# Patient Record
Sex: FEMALE | Race: WHITE | NOT HISPANIC OR LATINO | Employment: OTHER | URBAN - METROPOLITAN AREA
[De-identification: names, ages, dates, MRNs, and addresses within clinical notes are randomized per-mention and may not be internally consistent; named-entity substitution may affect disease eponyms.]

---

## 2017-05-26 ENCOUNTER — OFFICE VISIT (OUTPATIENT)
Dept: INTERNAL MEDICINE | Facility: CLINIC | Age: 42
End: 2017-05-26

## 2017-05-26 VITALS
BODY MASS INDEX: 37.25 KG/M2 | HEIGHT: 65 IN | SYSTOLIC BLOOD PRESSURE: 112 MMHG | DIASTOLIC BLOOD PRESSURE: 78 MMHG | WEIGHT: 223.6 LBS | TEMPERATURE: 98.5 F

## 2017-05-26 DIAGNOSIS — M54.2 NECK PAIN: ICD-10-CM

## 2017-05-26 DIAGNOSIS — F41.9 ANXIETY: ICD-10-CM

## 2017-05-26 DIAGNOSIS — R39.9 UTI SYMPTOMS: Primary | ICD-10-CM

## 2017-05-26 DIAGNOSIS — F32.A DEPRESSION, UNSPECIFIED DEPRESSION TYPE: ICD-10-CM

## 2017-05-26 DIAGNOSIS — R31.9 URINARY TRACT INFECTION WITH HEMATURIA, SITE UNSPECIFIED: ICD-10-CM

## 2017-05-26 DIAGNOSIS — N39.0 URINARY TRACT INFECTION WITH HEMATURIA, SITE UNSPECIFIED: ICD-10-CM

## 2017-05-26 LAB
BILIRUB BLD-MCNC: ABNORMAL MG/DL
CLARITY, POC: ABNORMAL
COLOR UR: ABNORMAL
GLUCOSE UR STRIP-MCNC: NEGATIVE MG/DL
KETONES UR QL: NEGATIVE
LEUKOCYTE EST, POC: ABNORMAL
NITRITE UR-MCNC: NEGATIVE MG/ML
PH UR: 5.5 [PH] (ref 5–8)
PROT UR STRIP-MCNC: ABNORMAL MG/DL
RBC # UR STRIP: NEGATIVE /UL
SP GR UR: 1.03 (ref 1–1.03)
UROBILINOGEN UR QL: NORMAL

## 2017-05-26 PROCEDURE — 87086 URINE CULTURE/COLONY COUNT: CPT | Performed by: NURSE PRACTITIONER

## 2017-05-26 PROCEDURE — 99214 OFFICE O/P EST MOD 30 MIN: CPT | Performed by: NURSE PRACTITIONER

## 2017-05-26 PROCEDURE — 81003 URINALYSIS AUTO W/O SCOPE: CPT | Performed by: NURSE PRACTITIONER

## 2017-05-26 RX ORDER — NAPROXEN 500 MG/1
500 TABLET ORAL 2 TIMES DAILY WITH MEALS
Qty: 60 TABLET | Refills: 5 | Status: ON HOLD | OUTPATIENT
Start: 2017-05-26 | End: 2020-12-18

## 2017-05-26 RX ORDER — CIPROFLOXACIN 500 MG/1
500 TABLET, FILM COATED ORAL 2 TIMES DAILY
Qty: 14 TABLET | Refills: 0 | Status: SHIPPED | OUTPATIENT
Start: 2017-05-26 | End: 2017-06-02

## 2017-05-26 RX ORDER — HYDROXYZINE HYDROCHLORIDE 25 MG/1
25 TABLET, FILM COATED ORAL 3 TIMES DAILY PRN
Qty: 90 TABLET | Refills: 5 | Status: ON HOLD | OUTPATIENT
Start: 2017-05-26 | End: 2020-12-18

## 2017-05-26 RX ORDER — FLUCONAZOLE 150 MG/1
150 TABLET ORAL ONCE
Qty: 1 TABLET | Refills: 0 | Status: SHIPPED | OUTPATIENT
Start: 2017-05-26 | End: 2017-05-26

## 2017-05-28 LAB — BACTERIA SPEC AEROBE CULT: NORMAL

## 2017-05-31 ENCOUNTER — TELEPHONE (OUTPATIENT)
Dept: INTERNAL MEDICINE | Facility: CLINIC | Age: 42
End: 2017-05-31

## 2017-06-08 ENCOUNTER — TELEPHONE (OUTPATIENT)
Dept: INTERNAL MEDICINE | Facility: CLINIC | Age: 42
End: 2017-06-08

## 2017-06-08 NOTE — TELEPHONE ENCOUNTER
PT NEEDS MRI SENT [facility] [place] [facility] DR LANTIGUA

## 2017-06-14 ENCOUNTER — TRANSCRIBE ORDERS (OUTPATIENT)
Dept: ADMINISTRATIVE | Facility: HOSPITAL | Age: 42
End: 2017-06-14

## 2017-06-14 DIAGNOSIS — Z12.31 VISIT FOR SCREENING MAMMOGRAM: Primary | ICD-10-CM

## 2017-06-16 NOTE — TELEPHONE ENCOUNTER
Left patient msg to cakk me back. I need to check to see if she has switched PCP's. The Dr she is requesting this to go to is a primary care Physician. If so she may need to sign a release on her records.

## 2017-10-05 ENCOUNTER — HOSPITAL ENCOUNTER (OUTPATIENT)
Dept: MAMMOGRAPHY | Facility: HOSPITAL | Age: 42
Discharge: HOME | End: 2017-10-05
Admitting: NURSE PRACTITIONER

## 2017-10-05 DIAGNOSIS — Z12.31 VISIT FOR SCREENING MAMMOGRAM: ICD-10-CM

## 2017-10-05 PROCEDURE — 77067 SCR MAMMO BI INCL CAD: CPT | Performed by: RADIOLOGY

## 2017-10-05 PROCEDURE — 77063 BREAST TOMOSYNTHESIS BI: CPT | Performed by: RADIOLOGY

## 2017-10-05 PROCEDURE — G0202 SCR MAMMO BI INCL CAD: HCPCS

## 2017-10-05 PROCEDURE — 77063 BREAST TOMOSYNTHESIS BI: CPT

## 2018-09-05 ENCOUNTER — TELEPHONE (OUTPATIENT)
Dept: INTERNAL MEDICINE | Facility: CLINIC | Age: 43
End: 2018-09-05

## 2018-09-05 NOTE — TELEPHONE ENCOUNTER
MS. HERNANDEZ IS A FORMER PATIENT OF BRYAN SOUZA, SHE IS WANTING TO START SEEING HER AGAIN. PLEASE ADVISE

## 2020-12-18 ENCOUNTER — APPOINTMENT (OUTPATIENT)
Dept: CT IMAGING | Facility: HOSPITAL | Age: 45
End: 2020-12-18

## 2020-12-18 ENCOUNTER — HOSPITAL ENCOUNTER (OUTPATIENT)
Facility: HOSPITAL | Age: 45
Setting detail: OBSERVATION
Discharge: HOME | End: 2020-12-19
Attending: STUDENT IN AN ORGANIZED HEALTH CARE EDUCATION/TRAINING PROGRAM | Admitting: STUDENT IN AN ORGANIZED HEALTH CARE EDUCATION/TRAINING PROGRAM

## 2020-12-18 ENCOUNTER — APPOINTMENT (OUTPATIENT)
Dept: GENERAL RADIOLOGY | Facility: HOSPITAL | Age: 45
End: 2020-12-18

## 2020-12-18 DIAGNOSIS — I20.0 UNSTABLE ANGINA (HCC): Primary | ICD-10-CM

## 2020-12-18 PROBLEM — R07.9 CHEST PAIN: Status: ACTIVE | Noted: 2020-12-18

## 2020-12-18 LAB
ALBUMIN SERPL-MCNC: 3.86 G/DL (ref 3.5–5.2)
ALBUMIN/GLOB SERPL: 1.3 G/DL
ALP SERPL-CCNC: 115 U/L (ref 39–117)
ALT SERPL W P-5'-P-CCNC: 21 U/L (ref 1–33)
ANION GAP SERPL CALCULATED.3IONS-SCNC: 10.3 MMOL/L (ref 5–15)
AST SERPL-CCNC: 24 U/L (ref 1–32)
BASOPHILS # BLD AUTO: 0.03 10*3/MM3 (ref 0–0.2)
BASOPHILS NFR BLD AUTO: 0.4 % (ref 0–1.5)
BILIRUB SERPL-MCNC: 0.2 MG/DL (ref 0–1.2)
BILIRUB UR QL STRIP: NEGATIVE
BUN SERPL-MCNC: 8 MG/DL (ref 6–20)
BUN/CREAT SERPL: 12.5 (ref 7–25)
CALCIUM SPEC-SCNC: 8.9 MG/DL (ref 8.6–10.5)
CHLORIDE SERPL-SCNC: 102 MMOL/L (ref 98–107)
CLARITY UR: CLEAR
CO2 SERPL-SCNC: 25.7 MMOL/L (ref 22–29)
COLOR UR: YELLOW
CREAT SERPL-MCNC: 0.64 MG/DL (ref 0.57–1)
CRP SERPL-MCNC: 0.95 MG/DL (ref 0–0.5)
D-LACTATE SERPL-SCNC: 1.8 MMOL/L (ref 0.5–2)
DEPRECATED RDW RBC AUTO: 49.2 FL (ref 37–54)
EOSINOPHIL # BLD AUTO: 0.25 10*3/MM3 (ref 0–0.4)
EOSINOPHIL NFR BLD AUTO: 3.2 % (ref 0.3–6.2)
ERYTHROCYTE [DISTWIDTH] IN BLOOD BY AUTOMATED COUNT: 17.2 % (ref 12.3–15.4)
ERYTHROCYTE [SEDIMENTATION RATE] IN BLOOD: 48 MM/HR (ref 0–20)
FLUAV RNA RESP QL NAA+PROBE: NOT DETECTED
FLUBV RNA RESP QL NAA+PROBE: NOT DETECTED
GFR SERPL CREATININE-BSD FRML MDRD: 100 ML/MIN/1.73
GLOBULIN UR ELPH-MCNC: 2.9 GM/DL
GLUCOSE SERPL-MCNC: 164 MG/DL (ref 65–99)
GLUCOSE UR STRIP-MCNC: NEGATIVE MG/DL
HCT VFR BLD AUTO: 33.2 % (ref 34–46.6)
HGB BLD-MCNC: 10.3 G/DL (ref 12–15.9)
HGB UR QL STRIP.AUTO: NEGATIVE
HOLD SPECIMEN: NORMAL
IMM GRANULOCYTES # BLD AUTO: 0.02 10*3/MM3 (ref 0–0.05)
IMM GRANULOCYTES NFR BLD AUTO: 0.3 % (ref 0–0.5)
INR PPP: 1.01 (ref 0.9–1.1)
KETONES UR QL STRIP: NEGATIVE
LEUKOCYTE ESTERASE UR QL STRIP.AUTO: NEGATIVE
LIPASE SERPL-CCNC: 33 U/L (ref 13–60)
LYMPHOCYTES # BLD AUTO: 2.41 10*3/MM3 (ref 0.7–3.1)
LYMPHOCYTES NFR BLD AUTO: 31.2 % (ref 19.6–45.3)
MAGNESIUM SERPL-MCNC: 1.9 MG/DL (ref 1.6–2.6)
MCH RBC QN AUTO: 24.6 PG (ref 26.6–33)
MCHC RBC AUTO-ENTMCNC: 31 G/DL (ref 31.5–35.7)
MCV RBC AUTO: 79.2 FL (ref 79–97)
MONOCYTES # BLD AUTO: 0.39 10*3/MM3 (ref 0.1–0.9)
MONOCYTES NFR BLD AUTO: 5 % (ref 5–12)
NEUTROPHILS NFR BLD AUTO: 4.63 10*3/MM3 (ref 1.7–7)
NEUTROPHILS NFR BLD AUTO: 59.9 % (ref 42.7–76)
NITRITE UR QL STRIP: NEGATIVE
NRBC BLD AUTO-RTO: 0 /100 WBC (ref 0–0.2)
NT-PROBNP SERPL-MCNC: 369.7 PG/ML (ref 0–450)
PH UR STRIP.AUTO: <=5 [PH] (ref 5–8)
PHOSPHATE SERPL-MCNC: 3.3 MG/DL (ref 2.5–4.5)
PLATELET # BLD AUTO: 366 10*3/MM3 (ref 140–450)
PMV BLD AUTO: 9.7 FL (ref 6–12)
POTASSIUM SERPL-SCNC: 4.1 MMOL/L (ref 3.5–5.2)
PROT SERPL-MCNC: 6.8 G/DL (ref 6–8.5)
PROT UR QL STRIP: NEGATIVE
PROTHROMBIN TIME: 13.1 SECONDS (ref 11.9–14.1)
RBC # BLD AUTO: 4.19 10*6/MM3 (ref 3.77–5.28)
SARS-COV-2 RNA RESP QL NAA+PROBE: NOT DETECTED
SODIUM SERPL-SCNC: 138 MMOL/L (ref 136–145)
SP GR UR STRIP: 1.01 (ref 1–1.03)
TROPONIN T SERPL-MCNC: <0.01 NG/ML (ref 0–0.03)
TROPONIN T SERPL-MCNC: <0.01 NG/ML (ref 0–0.03)
TSH SERPL DL<=0.05 MIU/L-ACNC: 0.85 UIU/ML (ref 0.27–4.2)
UROBILINOGEN UR QL STRIP: NORMAL
WBC # BLD AUTO: 7.73 10*3/MM3 (ref 3.4–10.8)
WHOLE BLOOD HOLD SPECIMEN: NORMAL

## 2020-12-18 PROCEDURE — 84484 ASSAY OF TROPONIN QUANT: CPT | Performed by: STUDENT IN AN ORGANIZED HEALTH CARE EDUCATION/TRAINING PROGRAM

## 2020-12-18 PROCEDURE — 93005 ELECTROCARDIOGRAM TRACING: CPT | Performed by: INTERNAL MEDICINE

## 2020-12-18 PROCEDURE — 80307 DRUG TEST PRSMV CHEM ANLYZR: CPT | Performed by: PHYSICIAN ASSISTANT

## 2020-12-18 PROCEDURE — 71275 CT ANGIOGRAPHY CHEST: CPT

## 2020-12-18 PROCEDURE — 87040 BLOOD CULTURE FOR BACTERIA: CPT | Performed by: STUDENT IN AN ORGANIZED HEALTH CARE EDUCATION/TRAINING PROGRAM

## 2020-12-18 PROCEDURE — 86140 C-REACTIVE PROTEIN: CPT | Performed by: STUDENT IN AN ORGANIZED HEALTH CARE EDUCATION/TRAINING PROGRAM

## 2020-12-18 PROCEDURE — 84100 ASSAY OF PHOSPHORUS: CPT | Performed by: STUDENT IN AN ORGANIZED HEALTH CARE EDUCATION/TRAINING PROGRAM

## 2020-12-18 PROCEDURE — 80053 COMPREHEN METABOLIC PANEL: CPT | Performed by: STUDENT IN AN ORGANIZED HEALTH CARE EDUCATION/TRAINING PROGRAM

## 2020-12-18 PROCEDURE — 96375 TX/PRO/DX INJ NEW DRUG ADDON: CPT

## 2020-12-18 PROCEDURE — 25010000002 HEPARIN (PORCINE) PER 1000 UNITS

## 2020-12-18 PROCEDURE — 99285 EMERGENCY DEPT VISIT HI MDM: CPT

## 2020-12-18 PROCEDURE — 83735 ASSAY OF MAGNESIUM: CPT | Performed by: STUDENT IN AN ORGANIZED HEALTH CARE EDUCATION/TRAINING PROGRAM

## 2020-12-18 PROCEDURE — 85025 COMPLETE CBC W/AUTO DIFF WBC: CPT | Performed by: STUDENT IN AN ORGANIZED HEALTH CARE EDUCATION/TRAINING PROGRAM

## 2020-12-18 PROCEDURE — G0378 HOSPITAL OBSERVATION PER HR: HCPCS

## 2020-12-18 PROCEDURE — 83605 ASSAY OF LACTIC ACID: CPT | Performed by: STUDENT IN AN ORGANIZED HEALTH CARE EDUCATION/TRAINING PROGRAM

## 2020-12-18 PROCEDURE — 84443 ASSAY THYROID STIM HORMONE: CPT | Performed by: STUDENT IN AN ORGANIZED HEALTH CARE EDUCATION/TRAINING PROGRAM

## 2020-12-18 PROCEDURE — 83690 ASSAY OF LIPASE: CPT | Performed by: STUDENT IN AN ORGANIZED HEALTH CARE EDUCATION/TRAINING PROGRAM

## 2020-12-18 PROCEDURE — 83880 ASSAY OF NATRIURETIC PEPTIDE: CPT | Performed by: STUDENT IN AN ORGANIZED HEALTH CARE EDUCATION/TRAINING PROGRAM

## 2020-12-18 PROCEDURE — 85610 PROTHROMBIN TIME: CPT | Performed by: STUDENT IN AN ORGANIZED HEALTH CARE EDUCATION/TRAINING PROGRAM

## 2020-12-18 PROCEDURE — 99203 OFFICE O/P NEW LOW 30 MIN: CPT | Performed by: INTERNAL MEDICINE

## 2020-12-18 PROCEDURE — 87636 SARSCOV2 & INF A&B AMP PRB: CPT | Performed by: STUDENT IN AN ORGANIZED HEALTH CARE EDUCATION/TRAINING PROGRAM

## 2020-12-18 PROCEDURE — 81003 URINALYSIS AUTO W/O SCOPE: CPT | Performed by: STUDENT IN AN ORGANIZED HEALTH CARE EDUCATION/TRAINING PROGRAM

## 2020-12-18 PROCEDURE — 93010 ELECTROCARDIOGRAM REPORT: CPT | Performed by: SPECIALIST

## 2020-12-18 PROCEDURE — 36415 COLL VENOUS BLD VENIPUNCTURE: CPT

## 2020-12-18 PROCEDURE — 0 IOPAMIDOL PER 1 ML: Performed by: STUDENT IN AN ORGANIZED HEALTH CARE EDUCATION/TRAINING PROGRAM

## 2020-12-18 PROCEDURE — 71045 X-RAY EXAM CHEST 1 VIEW: CPT

## 2020-12-18 PROCEDURE — 71275 CT ANGIOGRAPHY CHEST: CPT | Performed by: RADIOLOGY

## 2020-12-18 PROCEDURE — 25010000002 MORPHINE PER 10 MG: Performed by: STUDENT IN AN ORGANIZED HEALTH CARE EDUCATION/TRAINING PROGRAM

## 2020-12-18 PROCEDURE — C9803 HOPD COVID-19 SPEC COLLECT: HCPCS

## 2020-12-18 PROCEDURE — 93005 ELECTROCARDIOGRAM TRACING: CPT | Performed by: STUDENT IN AN ORGANIZED HEALTH CARE EDUCATION/TRAINING PROGRAM

## 2020-12-18 PROCEDURE — 85652 RBC SED RATE AUTOMATED: CPT | Performed by: STUDENT IN AN ORGANIZED HEALTH CARE EDUCATION/TRAINING PROGRAM

## 2020-12-18 PROCEDURE — 71045 X-RAY EXAM CHEST 1 VIEW: CPT | Performed by: RADIOLOGY

## 2020-12-18 RX ORDER — NITROGLYCERIN 0.4 MG/1
0.4 TABLET SUBLINGUAL
Status: DISCONTINUED | OUTPATIENT
Start: 2020-12-18 | End: 2020-12-20 | Stop reason: HOSPADM

## 2020-12-18 RX ORDER — LISINOPRIL 2.5 MG/1
2.5 TABLET ORAL DAILY
COMMUNITY

## 2020-12-18 RX ORDER — HYDROXYZINE PAMOATE 25 MG/1
25 CAPSULE ORAL 4 TIMES DAILY PRN
COMMUNITY

## 2020-12-18 RX ORDER — SODIUM CHLORIDE 0.9 % (FLUSH) 0.9 %
10 SYRINGE (ML) INJECTION AS NEEDED
Status: DISCONTINUED | OUTPATIENT
Start: 2020-12-18 | End: 2020-12-20 | Stop reason: HOSPADM

## 2020-12-18 RX ORDER — SODIUM CHLORIDE 0.9 % (FLUSH) 0.9 %
10 SYRINGE (ML) INJECTION EVERY 12 HOURS SCHEDULED
Status: DISCONTINUED | OUTPATIENT
Start: 2020-12-18 | End: 2020-12-20 | Stop reason: HOSPADM

## 2020-12-18 RX ORDER — CARVEDILOL 3.12 MG/1
3.12 TABLET ORAL 2 TIMES DAILY WITH MEALS
COMMUNITY

## 2020-12-18 RX ORDER — HEPARIN SODIUM 5000 [USP'U]/ML
5000 INJECTION, SOLUTION INTRAVENOUS; SUBCUTANEOUS EVERY 8 HOURS SCHEDULED
Status: DISCONTINUED | OUTPATIENT
Start: 2020-12-18 | End: 2020-12-20 | Stop reason: HOSPADM

## 2020-12-18 RX ORDER — ALBUTEROL SULFATE 90 UG/1
2 AEROSOL, METERED RESPIRATORY (INHALATION) EVERY 6 HOURS PRN
COMMUNITY

## 2020-12-18 RX ORDER — ASPIRIN 81 MG/1
81 TABLET, CHEWABLE ORAL DAILY
COMMUNITY

## 2020-12-18 RX ORDER — MORPHINE SULFATE 2 MG/ML
2 INJECTION, SOLUTION INTRAMUSCULAR; INTRAVENOUS ONCE
Status: COMPLETED | OUTPATIENT
Start: 2020-12-18 | End: 2020-12-18

## 2020-12-18 RX ORDER — ATORVASTATIN CALCIUM 80 MG/1
80 TABLET, FILM COATED ORAL NIGHTLY
COMMUNITY

## 2020-12-18 RX ORDER — FLUOXETINE HYDROCHLORIDE 20 MG/1
20 CAPSULE ORAL DAILY
COMMUNITY

## 2020-12-18 RX ORDER — ASPIRIN 81 MG/1
243 TABLET, CHEWABLE ORAL ONCE
Status: COMPLETED | OUTPATIENT
Start: 2020-12-18 | End: 2020-12-18

## 2020-12-18 RX ORDER — HEPARIN SODIUM 5000 [USP'U]/ML
4000 INJECTION, SOLUTION INTRAVENOUS; SUBCUTANEOUS ONCE
Status: COMPLETED | OUTPATIENT
Start: 2020-12-18 | End: 2020-12-18

## 2020-12-18 RX ORDER — HEPARIN SODIUM 5000 [USP'U]/ML
INJECTION, SOLUTION INTRAVENOUS; SUBCUTANEOUS
Status: COMPLETED
Start: 2020-12-18 | End: 2020-12-18

## 2020-12-18 RX ADMIN — ASPIRIN 243 MG: 81 TABLET, CHEWABLE ORAL at 19:46

## 2020-12-18 RX ADMIN — HEPARIN SODIUM 4000 UNITS: 5000 INJECTION, SOLUTION INTRAVENOUS; SUBCUTANEOUS at 19:49

## 2020-12-18 RX ADMIN — TICAGRELOR 180 MG: 90 TABLET ORAL at 19:50

## 2020-12-18 RX ADMIN — HEPARIN SODIUM 4000 UNITS: 5000 INJECTION INTRAVENOUS; SUBCUTANEOUS at 19:49

## 2020-12-18 RX ADMIN — MORPHINE SULFATE 2 MG: 2 INJECTION, SOLUTION INTRAMUSCULAR; INTRAVENOUS at 20:14

## 2020-12-18 RX ADMIN — IOPAMIDOL 85 ML: 755 INJECTION, SOLUTION INTRAVENOUS at 20:47

## 2020-12-19 ENCOUNTER — APPOINTMENT (OUTPATIENT)
Dept: NUCLEAR MEDICINE | Facility: HOSPITAL | Age: 45
End: 2020-12-19

## 2020-12-19 ENCOUNTER — APPOINTMENT (OUTPATIENT)
Dept: ULTRASOUND IMAGING | Facility: HOSPITAL | Age: 45
End: 2020-12-19

## 2020-12-19 ENCOUNTER — APPOINTMENT (OUTPATIENT)
Dept: CARDIOLOGY | Facility: HOSPITAL | Age: 45
End: 2020-12-19

## 2020-12-19 VITALS
BODY MASS INDEX: 41.65 KG/M2 | SYSTOLIC BLOOD PRESSURE: 124 MMHG | RESPIRATION RATE: 20 BRPM | WEIGHT: 250 LBS | HEART RATE: 71 BPM | TEMPERATURE: 98.7 F | HEIGHT: 65 IN | OXYGEN SATURATION: 98 % | DIASTOLIC BLOOD PRESSURE: 69 MMHG

## 2020-12-19 PROBLEM — E78.5 HYPERLIPIDEMIA: Chronic | Status: ACTIVE | Noted: 2020-12-19

## 2020-12-19 PROBLEM — I44.7 LBBB (LEFT BUNDLE BRANCH BLOCK): Chronic | Status: ACTIVE | Noted: 2020-12-19

## 2020-12-19 PROBLEM — I25.10 CAD (CORONARY ARTERY DISEASE): Chronic | Status: ACTIVE | Noted: 2020-12-19

## 2020-12-19 PROBLEM — I10 ESSENTIAL HYPERTENSION: Chronic | Status: ACTIVE | Noted: 2020-12-19

## 2020-12-19 PROBLEM — I25.5 ISCHEMIC CARDIOMYOPATHY: Chronic | Status: ACTIVE | Noted: 2020-12-19

## 2020-12-19 PROBLEM — R56.9 SEIZURES: Chronic | Status: ACTIVE | Noted: 2020-12-19

## 2020-12-19 LAB
6-ACETYL MORPHINE: NEGATIVE
ALBUMIN SERPL-MCNC: 3.63 G/DL (ref 3.5–5.2)
ALBUMIN/GLOB SERPL: 1.4 G/DL
ALP SERPL-CCNC: 100 U/L (ref 39–117)
ALT SERPL W P-5'-P-CCNC: 18 U/L (ref 1–33)
AMPHET+METHAMPHET UR QL: NEGATIVE
ANION GAP SERPL CALCULATED.3IONS-SCNC: 8.4 MMOL/L (ref 5–15)
AST SERPL-CCNC: 17 U/L (ref 1–32)
BARBITURATES UR QL SCN: NEGATIVE
BASOPHILS # BLD AUTO: 0.04 10*3/MM3 (ref 0–0.2)
BASOPHILS NFR BLD AUTO: 0.6 % (ref 0–1.5)
BENZODIAZ UR QL SCN: NEGATIVE
BH CV ECHO MEAS - AO MAX PG: 8.3 MMHG
BH CV ECHO MEAS - AO MEAN PG: 4 MMHG
BH CV ECHO MEAS - AO ROOT AREA (BSA CORRECTED): 1.6
BH CV ECHO MEAS - AO ROOT AREA: 9.1 CM^2
BH CV ECHO MEAS - AO ROOT DIAM: 3.4 CM
BH CV ECHO MEAS - AO V2 MAX: 144 CM/SEC
BH CV ECHO MEAS - AO V2 MEAN: 93 CM/SEC
BH CV ECHO MEAS - AO V2 VTI: 29.1 CM
BH CV ECHO MEAS - BSA(HAYCOCK): 2.3 M^2
BH CV ECHO MEAS - BSA: 2.2 M^2
BH CV ECHO MEAS - BZI_BMI: 41.6 KILOGRAMS/M^2
BH CV ECHO MEAS - BZI_METRIC_HEIGHT: 165.1 CM
BH CV ECHO MEAS - BZI_METRIC_WEIGHT: 113.4 KG
BH CV ECHO MEAS - EDV(MOD-SP4): 121 ML
BH CV ECHO MEAS - EF(MOD-SP4): 41.2 %
BH CV ECHO MEAS - ESV(MOD-SP4): 71.2 ML
BH CV ECHO MEAS - LA DIMENSION: 3.6 CM
BH CV ECHO MEAS - LA/AO: 1.1
BH CV ECHO MEAS - LV DIASTOLIC VOL/BSA (35-75): 55.6 ML/M^2
BH CV ECHO MEAS - LV SYSTOLIC VOL/BSA (12-30): 32.7 ML/M^2
BH CV ECHO MEAS - LVLD AP4: 8.1 CM
BH CV ECHO MEAS - LVLS AP4: 7.3 CM
BH CV ECHO MEAS - LVOT AREA (M): 2.3 CM^2
BH CV ECHO MEAS - LVOT AREA: 2.3 CM^2
BH CV ECHO MEAS - LVOT DIAM: 1.7 CM
BH CV ECHO MEAS - MV A MAX VEL: 87 CM/SEC
BH CV ECHO MEAS - MV E MAX VEL: 124 CM/SEC
BH CV ECHO MEAS - MV E/A: 1.4
BH CV ECHO MEAS - PA ACC TIME: 0.14 SEC
BH CV ECHO MEAS - PA PR(ACCEL): 17.4 MMHG
BH CV ECHO MEAS - RAP SYSTOLE: 10 MMHG
BH CV ECHO MEAS - RVSP: 24.6 MMHG
BH CV ECHO MEAS - SI(AO): 121.5 ML/M^2
BH CV ECHO MEAS - SI(MOD-SP4): 22.9 ML/M^2
BH CV ECHO MEAS - SV(AO): 264.2 ML
BH CV ECHO MEAS - SV(MOD-SP4): 49.8 ML
BH CV ECHO MEAS - TR MAX VEL: 191 CM/SEC
BH CV NUCLEAR PRIOR STUDY: 3
BH CV STRESS BP STAGE 1: NORMAL
BH CV STRESS BP STAGE 2: NORMAL
BH CV STRESS COMMENTS STAGE 1: NORMAL
BH CV STRESS COMMENTS STAGE 2: NORMAL
BH CV STRESS DOSE REGADENOSON STAGE 1: 0.4
BH CV STRESS DURATION MIN STAGE 1: 0
BH CV STRESS DURATION MIN STAGE 2: 4
BH CV STRESS DURATION SEC STAGE 1: 10
BH CV STRESS DURATION SEC STAGE 2: 0
BH CV STRESS HR STAGE 1: 100
BH CV STRESS HR STAGE 2: 101
BH CV STRESS PROTOCOL 1: NORMAL
BH CV STRESS RECOVERY BP: NORMAL MMHG
BH CV STRESS RECOVERY HR: 85 BPM
BH CV STRESS STAGE 1: 1
BH CV STRESS STAGE 2: 2
BILIRUB SERPL-MCNC: 0.2 MG/DL (ref 0–1.2)
BUN SERPL-MCNC: 8 MG/DL (ref 6–20)
BUN/CREAT SERPL: 14.3 (ref 7–25)
BUPRENORPHINE SERPL-MCNC: NEGATIVE NG/ML
CALCIUM SPEC-SCNC: 8.8 MG/DL (ref 8.6–10.5)
CANNABINOIDS SERPL QL: NEGATIVE
CHLORIDE SERPL-SCNC: 105 MMOL/L (ref 98–107)
CHOLEST SERPL-MCNC: 144 MG/DL (ref 0–200)
CO2 SERPL-SCNC: 25.6 MMOL/L (ref 22–29)
COCAINE UR QL: NEGATIVE
CREAT SERPL-MCNC: 0.56 MG/DL (ref 0.57–1)
DEPRECATED RDW RBC AUTO: 52.5 FL (ref 37–54)
EOSINOPHIL # BLD AUTO: 0.3 10*3/MM3 (ref 0–0.4)
EOSINOPHIL NFR BLD AUTO: 4.6 % (ref 0.3–6.2)
ERYTHROCYTE [DISTWIDTH] IN BLOOD BY AUTOMATED COUNT: 17.4 % (ref 12.3–15.4)
FERRITIN SERPL-MCNC: 20.97 NG/ML (ref 13–150)
FOLATE SERPL-MCNC: 6.01 NG/ML (ref 4.78–24.2)
GFR SERPL CREATININE-BSD FRML MDRD: 117 ML/MIN/1.73
GLOBULIN UR ELPH-MCNC: 2.7 GM/DL
GLUCOSE SERPL-MCNC: 117 MG/DL (ref 65–99)
HBA1C MFR BLD: 6 % (ref 4.8–5.6)
HCT VFR BLD AUTO: 33 % (ref 34–46.6)
HDLC SERPL-MCNC: 27 MG/DL (ref 40–60)
HGB BLD-MCNC: 9.6 G/DL (ref 12–15.9)
IMM GRANULOCYTES # BLD AUTO: 0.02 10*3/MM3 (ref 0–0.05)
IMM GRANULOCYTES NFR BLD AUTO: 0.3 % (ref 0–0.5)
IRON 24H UR-MRATE: 25 MCG/DL (ref 37–145)
IRON SATN MFR SERPL: 6 % (ref 20–50)
LDLC SERPL CALC-MCNC: 85 MG/DL (ref 0–100)
LDLC/HDLC SERPL: 2.98 {RATIO}
LV EF NUC BP: 38 %
LYMPHOCYTES # BLD AUTO: 2.82 10*3/MM3 (ref 0.7–3.1)
LYMPHOCYTES NFR BLD AUTO: 43.7 % (ref 19.6–45.3)
MAXIMAL PREDICTED HEART RATE: 175 BPM
MAXIMAL PREDICTED HEART RATE: 175 BPM
MCH RBC QN AUTO: 23.9 PG (ref 26.6–33)
MCHC RBC AUTO-ENTMCNC: 29.1 G/DL (ref 31.5–35.7)
MCV RBC AUTO: 82.3 FL (ref 79–97)
METHADONE UR QL SCN: NEGATIVE
MONOCYTES # BLD AUTO: 0.52 10*3/MM3 (ref 0.1–0.9)
MONOCYTES NFR BLD AUTO: 8 % (ref 5–12)
NEUTROPHILS NFR BLD AUTO: 2.76 10*3/MM3 (ref 1.7–7)
NEUTROPHILS NFR BLD AUTO: 42.8 % (ref 42.7–76)
NRBC BLD AUTO-RTO: 0 /100 WBC (ref 0–0.2)
OPIATES UR QL: NEGATIVE
OXYCODONE UR QL SCN: NEGATIVE
PCP UR QL SCN: NEGATIVE
PERCENT MAX PREDICTED HR: 57.14 %
PLATELET # BLD AUTO: 341 10*3/MM3 (ref 140–450)
PMV BLD AUTO: 9.6 FL (ref 6–12)
POTASSIUM SERPL-SCNC: 3.9 MMOL/L (ref 3.5–5.2)
PROT SERPL-MCNC: 6.3 G/DL (ref 6–8.5)
QT INTERVAL: 424 MS
QT INTERVAL: 472 MS
QTC INTERVAL: 516 MS
QTC INTERVAL: 532 MS
RBC # BLD AUTO: 4.01 10*6/MM3 (ref 3.77–5.28)
SODIUM SERPL-SCNC: 139 MMOL/L (ref 136–145)
STRESS BASELINE BP: NORMAL MMHG
STRESS BASELINE HR: 72 BPM
STRESS PERCENT HR: 67 %
STRESS POST PEAK BP: NORMAL MMHG
STRESS POST PEAK HR: 100 BPM
STRESS TARGET HR: 149 BPM
STRESS TARGET HR: 149 BPM
TIBC SERPL-MCNC: 443 MCG/DL (ref 298–536)
TRANSFERRIN SERPL-MCNC: 297 MG/DL (ref 200–360)
TRIGL SERPL-MCNC: 183 MG/DL (ref 0–150)
TROPONIN T SERPL-MCNC: <0.01 NG/ML (ref 0–0.03)
VIT B12 BLD-MCNC: 364 PG/ML (ref 211–946)
VLDLC SERPL-MCNC: 32 MG/DL (ref 5–40)
WBC # BLD AUTO: 6.46 10*3/MM3 (ref 3.4–10.8)

## 2020-12-19 PROCEDURE — A9500 TC99M SESTAMIBI: HCPCS | Performed by: STUDENT IN AN ORGANIZED HEALTH CARE EDUCATION/TRAINING PROGRAM

## 2020-12-19 PROCEDURE — 94799 UNLISTED PULMONARY SVC/PX: CPT

## 2020-12-19 PROCEDURE — 25010000002 MAGNESIUM SULFATE IN D5W 1G/100ML (PREMIX) 1-5 GM/100ML-% SOLUTION: Performed by: INTERNAL MEDICINE

## 2020-12-19 PROCEDURE — G0378 HOSPITAL OBSERVATION PER HR: HCPCS

## 2020-12-19 PROCEDURE — 25010000002 PERFLUTREN (DEFINITY) 8.476 MG IN SODIUM CHLORIDE 0.9 % 10 ML INJECTION: Performed by: STUDENT IN AN ORGANIZED HEALTH CARE EDUCATION/TRAINING PROGRAM

## 2020-12-19 PROCEDURE — 93880 EXTRACRANIAL BILAT STUDY: CPT

## 2020-12-19 PROCEDURE — 0 TECHNETIUM SESTAMIBI: Performed by: STUDENT IN AN ORGANIZED HEALTH CARE EDUCATION/TRAINING PROGRAM

## 2020-12-19 PROCEDURE — 83540 ASSAY OF IRON: CPT | Performed by: PHYSICIAN ASSISTANT

## 2020-12-19 PROCEDURE — 93017 CV STRESS TEST TRACING ONLY: CPT

## 2020-12-19 PROCEDURE — 93306 TTE W/DOPPLER COMPLETE: CPT | Performed by: INTERNAL MEDICINE

## 2020-12-19 PROCEDURE — 78452 HT MUSCLE IMAGE SPECT MULT: CPT | Performed by: SPECIALIST

## 2020-12-19 PROCEDURE — 84484 ASSAY OF TROPONIN QUANT: CPT | Performed by: INTERNAL MEDICINE

## 2020-12-19 PROCEDURE — 25010000002 REGADENOSON 0.4 MG/5ML SOLUTION: Performed by: STUDENT IN AN ORGANIZED HEALTH CARE EDUCATION/TRAINING PROGRAM

## 2020-12-19 PROCEDURE — 94660 CPAP INITIATION&MGMT: CPT

## 2020-12-19 PROCEDURE — 96365 THER/PROPH/DIAG IV INF INIT: CPT

## 2020-12-19 PROCEDURE — 80061 LIPID PANEL: CPT | Performed by: INTERNAL MEDICINE

## 2020-12-19 PROCEDURE — 82607 VITAMIN B-12: CPT | Performed by: PHYSICIAN ASSISTANT

## 2020-12-19 PROCEDURE — 99214 OFFICE O/P EST MOD 30 MIN: CPT | Performed by: PHYSICIAN ASSISTANT

## 2020-12-19 PROCEDURE — 78452 HT MUSCLE IMAGE SPECT MULT: CPT

## 2020-12-19 PROCEDURE — 83036 HEMOGLOBIN GLYCOSYLATED A1C: CPT | Performed by: INTERNAL MEDICINE

## 2020-12-19 PROCEDURE — 93018 CV STRESS TEST I&R ONLY: CPT | Performed by: SPECIALIST

## 2020-12-19 PROCEDURE — 93880 EXTRACRANIAL BILAT STUDY: CPT | Performed by: RADIOLOGY

## 2020-12-19 PROCEDURE — 93306 TTE W/DOPPLER COMPLETE: CPT

## 2020-12-19 PROCEDURE — 85025 COMPLETE CBC W/AUTO DIFF WBC: CPT | Performed by: INTERNAL MEDICINE

## 2020-12-19 PROCEDURE — 82746 ASSAY OF FOLIC ACID SERUM: CPT | Performed by: PHYSICIAN ASSISTANT

## 2020-12-19 PROCEDURE — 80053 COMPREHEN METABOLIC PANEL: CPT | Performed by: INTERNAL MEDICINE

## 2020-12-19 PROCEDURE — 99213 OFFICE O/P EST LOW 20 MIN: CPT | Performed by: INTERNAL MEDICINE

## 2020-12-19 PROCEDURE — 84466 ASSAY OF TRANSFERRIN: CPT | Performed by: PHYSICIAN ASSISTANT

## 2020-12-19 PROCEDURE — 82728 ASSAY OF FERRITIN: CPT | Performed by: PHYSICIAN ASSISTANT

## 2020-12-19 RX ORDER — ACETAMINOPHEN 325 MG/1
650 TABLET ORAL EVERY 6 HOURS PRN
Status: DISCONTINUED | OUTPATIENT
Start: 2020-12-19 | End: 2020-12-20 | Stop reason: HOSPADM

## 2020-12-19 RX ORDER — LISINOPRIL 2.5 MG/1
2.5 TABLET ORAL DAILY
Status: CANCELLED | OUTPATIENT
Start: 2020-12-19

## 2020-12-19 RX ORDER — ALBUTEROL SULFATE 2.5 MG/3ML
2.5 SOLUTION RESPIRATORY (INHALATION) EVERY 6 HOURS PRN
Status: CANCELLED | OUTPATIENT
Start: 2020-12-19

## 2020-12-19 RX ORDER — LISINOPRIL 2.5 MG/1
5 TABLET ORAL DAILY
Status: DISCONTINUED | OUTPATIENT
Start: 2020-12-19 | End: 2020-12-19

## 2020-12-19 RX ORDER — LISINOPRIL 2.5 MG/1
5 TABLET ORAL DAILY
Status: DISCONTINUED | OUTPATIENT
Start: 2020-12-20 | End: 2020-12-19

## 2020-12-19 RX ORDER — MAGNESIUM SULFATE 1 G/100ML
1 INJECTION INTRAVENOUS ONCE
Status: COMPLETED | OUTPATIENT
Start: 2020-12-19 | End: 2020-12-19

## 2020-12-19 RX ORDER — CARVEDILOL 3.12 MG/1
3.12 TABLET ORAL 2 TIMES DAILY WITH MEALS
Status: DISCONTINUED | OUTPATIENT
Start: 2020-12-19 | End: 2020-12-20 | Stop reason: HOSPADM

## 2020-12-19 RX ORDER — LISINOPRIL 2.5 MG/1
2.5 TABLET ORAL DAILY
Status: DISCONTINUED | OUTPATIENT
Start: 2020-12-19 | End: 2020-12-19

## 2020-12-19 RX ORDER — ASPIRIN 81 MG/1
81 TABLET, CHEWABLE ORAL DAILY
Status: DISCONTINUED | OUTPATIENT
Start: 2020-12-19 | End: 2020-12-20 | Stop reason: HOSPADM

## 2020-12-19 RX ORDER — HYDROXYZINE 50 MG/1
25 TABLET, FILM COATED ORAL 4 TIMES DAILY PRN
Status: CANCELLED | OUTPATIENT
Start: 2020-12-19

## 2020-12-19 RX ORDER — ATORVASTATIN CALCIUM 40 MG/1
80 TABLET, FILM COATED ORAL NIGHTLY
Status: DISCONTINUED | OUTPATIENT
Start: 2020-12-19 | End: 2020-12-20 | Stop reason: HOSPADM

## 2020-12-19 RX ORDER — ATORVASTATIN CALCIUM 40 MG/1
80 TABLET, FILM COATED ORAL NIGHTLY
Status: CANCELLED | OUTPATIENT
Start: 2020-12-19

## 2020-12-19 RX ORDER — FLUOXETINE HYDROCHLORIDE 20 MG/1
20 CAPSULE ORAL DAILY
Status: DISCONTINUED | OUTPATIENT
Start: 2020-12-19 | End: 2020-12-20 | Stop reason: HOSPADM

## 2020-12-19 RX ORDER — CARVEDILOL 3.12 MG/1
3.12 TABLET ORAL 2 TIMES DAILY WITH MEALS
Status: CANCELLED | OUTPATIENT
Start: 2020-12-19

## 2020-12-19 RX ORDER — ASPIRIN 81 MG/1
81 TABLET, CHEWABLE ORAL DAILY
Status: CANCELLED | OUTPATIENT
Start: 2020-12-19

## 2020-12-19 RX ORDER — MAGNESIUM SULFATE 1 G/100ML
1 INJECTION INTRAVENOUS AS NEEDED
Status: DISCONTINUED | OUTPATIENT
Start: 2020-12-19 | End: 2020-12-20 | Stop reason: HOSPADM

## 2020-12-19 RX ORDER — PANTOPRAZOLE SODIUM 40 MG/1
40 TABLET, DELAYED RELEASE ORAL
Status: DISCONTINUED | OUTPATIENT
Start: 2020-12-19 | End: 2020-12-20 | Stop reason: HOSPADM

## 2020-12-19 RX ORDER — HYDROXYZINE 50 MG/1
25 TABLET, FILM COATED ORAL 3 TIMES DAILY PRN
Status: DISCONTINUED | OUTPATIENT
Start: 2020-12-19 | End: 2020-12-19

## 2020-12-19 RX ORDER — LISINOPRIL 2.5 MG/1
2.5 TABLET ORAL DAILY
Status: DISCONTINUED | OUTPATIENT
Start: 2020-12-19 | End: 2020-12-20 | Stop reason: HOSPADM

## 2020-12-19 RX ORDER — MAGNESIUM SULFATE HEPTAHYDRATE 40 MG/ML
2 INJECTION, SOLUTION INTRAVENOUS AS NEEDED
Status: DISCONTINUED | OUTPATIENT
Start: 2020-12-19 | End: 2020-12-20 | Stop reason: HOSPADM

## 2020-12-19 RX ORDER — HYDROXYZINE 50 MG/1
25 TABLET, FILM COATED ORAL 4 TIMES DAILY PRN
Status: DISCONTINUED | OUTPATIENT
Start: 2020-12-19 | End: 2020-12-20 | Stop reason: HOSPADM

## 2020-12-19 RX ORDER — FLUOXETINE HYDROCHLORIDE 20 MG/1
20 CAPSULE ORAL DAILY
Status: CANCELLED | OUTPATIENT
Start: 2020-12-19

## 2020-12-19 RX ADMIN — ASPIRIN 81 MG: 81 TABLET, CHEWABLE ORAL at 11:47

## 2020-12-19 RX ADMIN — SODIUM CHLORIDE, PRESERVATIVE FREE 10 ML: 5 INJECTION INTRAVENOUS at 00:46

## 2020-12-19 RX ADMIN — MAGNESIUM SULFATE IN DEXTROSE 1 G: 10 INJECTION, SOLUTION INTRAVENOUS at 06:38

## 2020-12-19 RX ADMIN — REGADENOSON 0.4 MG: 0.08 INJECTION, SOLUTION INTRAVENOUS at 10:27

## 2020-12-19 RX ADMIN — PERFLUTREN 1 ML: 6.52 INJECTION, SUSPENSION INTRAVENOUS at 11:00

## 2020-12-19 RX ADMIN — HYDROXYZINE HYDROCHLORIDE 25 MG: 50 TABLET ORAL at 18:50

## 2020-12-19 RX ADMIN — CARVEDILOL 3.12 MG: 3.12 TABLET, FILM COATED ORAL at 17:08

## 2020-12-19 RX ADMIN — TECHNETIUM TC 99M SESTAMIBI 1 DOSE: 1 INJECTION INTRAVENOUS at 08:17

## 2020-12-19 RX ADMIN — CARVEDILOL 3.12 MG: 3.12 TABLET, FILM COATED ORAL at 11:47

## 2020-12-19 RX ADMIN — FLUOXETINE HYDROCHLORIDE 20 MG: 20 CAPSULE ORAL at 11:47

## 2020-12-19 RX ADMIN — LISINOPRIL 2.5 MG: 2.5 TABLET ORAL at 18:35

## 2020-12-19 RX ADMIN — SODIUM CHLORIDE, PRESERVATIVE FREE 10 ML: 5 INJECTION INTRAVENOUS at 11:52

## 2020-12-19 RX ADMIN — ATORVASTATIN CALCIUM 80 MG: 40 TABLET, FILM COATED ORAL at 20:44

## 2020-12-19 RX ADMIN — TECHNETIUM TC 99M SESTAMIBI 1 DOSE: 1 INJECTION INTRAVENOUS at 10:27

## 2020-12-19 NOTE — ED PROVIDER NOTES
"Subjective   History of Present Illness  This 45-year-old female presents to the emergency department for evaluation evaluation of acute substernal chest pain in the setting of syncope.  She was sitting around the table when she felt sudden onset substernal chest pain over the left side and middle.  She said it was a crushing pain.  She began to feel lightheaded and she actually passed out she regained consciousness within 15 to 30 seconds.  Patient has a significant cardiac history.  She was seen at  in [place] approximately 2 years ago at that time she had a heart cath.  She said she had blockages of 50% and 20% does not not know what coronary arteries were involved.  She had a stress test that she said showed a \"silent heart attack\".  She also says that she had an echocardiogram that showed that her heart was functioning at \"20% of normal.\"  She took 181 mg aspirin on the way here.  Vital signs are stable    Review of Systems   Constitutional: Positive for fatigue.   HENT: Negative.    Eyes: Negative.    Respiratory: Negative.    Cardiovascular: Positive for chest pain and palpitations.   Gastrointestinal: Negative.    Endocrine: Negative.    Genitourinary: Negative.    Musculoskeletal: Negative.    Skin: Negative.    Allergic/Immunologic: Negative.    Neurological: Positive for syncope.   Hematological: Negative.    Psychiatric/Behavioral: The patient is nervous/anxious.        Past Medical History:   Diagnosis Date   • CAD (coronary artery disease) 12/19/2020    Non obstructive   • Depression    • GERD (gastroesophageal reflux disease)    • Hyperlipidemia    • Hypertension    • Ischemic cardiomyopathy 12/19/2020   • LBBB (left bundle branch block)    • KENDALL (obstructive sleep apnea)     CPAP   • Seizures (CMS/HCC)     last sz was one month ago       Allergies   Allergen Reactions   • Keflex [Cephalexin]    • Tricor [Fenofibrate]    • Metoprolol Unknown - Low Severity     Pt states her doctor took her off her " medications   • Adhesive Tape Rash   • Latex Rash       Past Surgical History:   Procedure Laterality Date   • APPENDECTOMY  2014   • CARDIAC CATHETERIZATION     • CHOLECYSTECTOMY  1996   • TUBAL ABDOMINAL LIGATION  2003       Family History   Problem Relation Age of Onset   • Diabetes Mother    • Hypertension Mother    • Breast cancer Mother    • Heart attack Father         x2   • Hypertension Father    • Cancer Maternal Aunt         thyroid cancer   • Cancer Maternal Uncle         colon cancer   • Aneurysm Maternal Grandmother         brain   • Asthma Brother    • Depression Brother    • Scoliosis Brother    • No Known Problems Maternal Grandfather    • No Known Problems Paternal Grandmother    • No Known Problems Paternal Grandfather        Social History     Socioeconomic History   • Marital status:      Spouse name: Not on file   • Number of children: Not on file   • Years of education: Not on file   • Highest education level: Not on file   Tobacco Use   • Smoking status: Current Every Day Smoker     Packs/day: 1.00     Years: 18.00     Pack years: 18.00   • Smokeless tobacco: Never Used   • Tobacco comment: sometimes more   Substance and Sexual Activity   • Alcohol use: Yes     Comment: on occassion    • Drug use: Yes     Types: Marijuana     Comment: every now and then   • Sexual activity: Yes     Partners: Female           Objective   Physical Exam  Vitals signs and nursing note reviewed. Exam conducted with a chaperone present.   Constitutional:       Appearance: Normal appearance.      Comments: Obese 45-year-old female who is tearful, she is clutching her left chest/sternum.  She is tremulous.  She has an increased respiratory rate.  She does not appear toxic   HENT:      Head: Normocephalic and atraumatic.      Right Ear: External ear normal.      Left Ear: External ear normal.      Nose: Nose normal.      Mouth/Throat:      Mouth: Mucous membranes are moist.      Pharynx: Oropharynx is clear.    Eyes:      Extraocular Movements: Extraocular movements intact.      Pupils: Pupils are equal, round, and reactive to light.   Neck:      Musculoskeletal: Normal range of motion and neck supple.   Cardiovascular:      Rate and Rhythm: Normal rate and regular rhythm.      Pulses: Normal pulses.      Heart sounds: Normal heart sounds.      Comments: No murmurs rubs or gallops. No tenderness over the anterior chest wall. Trace pitting edema in bilateral extremities.   Pulmonary:      Effort: Pulmonary effort is normal.      Breath sounds: Normal breath sounds.      Comments: No crackles, wheezes, rhonchi.   Abdominal:      General: Abdomen is flat. Bowel sounds are normal.      Palpations: Abdomen is soft.   Musculoskeletal: Normal range of motion.   Skin:     General: Skin is warm and dry.      Capillary Refill: Capillary refill takes less than 2 seconds.   Neurological:      General: No focal deficit present.      Mental Status: She is alert and oriented to person, place, and time.   Psychiatric:         Mood and Affect: Mood normal.         Behavior: Behavior normal.         Thought Content: Thought content normal.         Judgment: Judgment normal.         Procedures           ED Course  ED Course as of Dec 20 0103   Fri Dec 18, 2020   1934 KG interpretation, normal sinus rhythm with left bundle branch block, ventricular rate 95, , , QTc prolonged at 532.    [JM]   1944 Discussed the case with Dr. Marshall interventional cardiology unfortunately we do not have medical records from 2 years ago and the patient had a full cardiac evaluation.  Her last EKG shows no sign of left bundle branch block.  She is having crushing substernal chest pain here in the setting of syncope.  Is very likely this is a cardiac event.  We will activate the 1 push at this time and Dr. Marshall is on his way to evaluate the patient himself.  He recommends starting aspirin Brilinta and heparin in the meantime we will try and find  out who her normal cardiologist is so we can get records.    [JM]   2122 Interventional cardiology and I have reviewed the case.  She does not have aortic dissection.  Is unlikely that she is having a STEMI at this time.  We will cancel the code STEMI.  He recommends that the patient be admitted to the hospital team for serial troponins.  We will contact the hospitalist at this time.    [JM]   7582 Discussed case with the hospitalist.  Will repeat troponin right now.  If it is positive I will call interventional cardiology back.  If it is negative she will be admitted    [JM]      ED Course User Index  [JM] Juan Carlos Parsons, DO                                         HEART Score (for prediction of 6-week risk of major adverse cardiac event) reviewed and/or performed as part of the patient evaluation and treatment planning process.  The result associated with this review/performance is: 5       MDM  Number of Diagnoses or Management Options  Unstable angina (CMS/HCC): new and requires workup  Diagnosis management comments: Patient presented to the ED for evaluation of chest pain. She has a new LBBB or at least we assumed it was new until we could obtain records. Patient was treated at  about 2 years ago for cardiomyopathy. She has HFrEF, history of MI. Last EKG in 2016 shows no sign of LBBB. Cardiology was consulted immediately and one push was activated. Cardiology was able to access records at . There was LBBB present on EKG from 2 years ago. Cardiology at bedside. Decision was made to de escalate. Initial troponin is negative. Patient received appropriate medications. She will be admitted to the floor for Unstable angina, further cardiac evaluation is indicated given her current story.        Amount and/or Complexity of Data Reviewed  Clinical lab tests: reviewed and ordered  Tests in the radiology section of CPT®: reviewed and ordered  Tests in the medicine section of CPT®: reviewed and ordered  Discussion  of test results with the performing providers: yes  Decide to obtain previous medical records or to obtain history from someone other than the patient: yes  Obtain history from someone other than the patient: yes  Review and summarize past medical records: yes  Discuss the patient with other providers: yes  Independent visualization of images, tracings, or specimens: yes    Risk of Complications, Morbidity, and/or Mortality  Presenting problems: high  Diagnostic procedures: high  Management options: high  General comments: 30 minutes of critical care provided. This time excludes other billable procedures. Time does include preparation of documents, medical consultations, review of old records, and direct bedside care. Patient was at high risk for life-threatening deterioration due to possible ACS.       Critical Care  Total time providing critical care: < 30 minutes    Patient Progress  Patient progress: stable      Final diagnoses:   Unstable angina (CMS/[facility])            Juan Carlos Parsons DO  12/20/20 0107

## 2020-12-19 NOTE — ED NOTES
[facility]      [place], [facility]  12/18/20 2052

## 2020-12-19 NOTE — PROGRESS NOTES
Patient Identification:  Name:  Esme Francois  Age:  45 y.o.  Sex:  female  :  1975  MRN:  [id number]  Visit Number:  [id number]    Chief Complaint:   Chest pain    Subjective:    Patient was seen and examined.  No further episodes of chest pain or dizziness.  No arrhythmias on telemetry.  Troponins have been negative since admission.  Patient did test positive for orthostatic hypotension.  Reviewed and discussed the results of echocardiogram and stress test with patient.  ----------------------------------------------------------------------------------------------------------------------  Current Hospital Meds:  aspirin, 81 mg, Oral, Daily  atorvastatin, 80 mg, Oral, Nightly  carvedilol, 3.125 mg, Oral, BID With Meals  FLUoxetine, 20 mg, Oral, Daily  heparin (porcine), 5,000 Units, Subcutaneous, Q8H  lisinopril, 2.5 mg, Oral, Daily  pantoprazole, 40 mg, Oral, Q AM  sodium chloride, 10 mL, Intravenous, Q12H         ----------------------------------------------------------------------------------------------------------------------  Vital Signs:  Temp:  [97.8 °F (36.6 °C)-99.5 °F (37.5 °C)] 97.8 °F (36.6 °C)  Heart Rate:  [70-92] 81  Resp:  [18-20] 20  BP: (100-148)/(58-93) 100/65      20  1936 20  2314 20  0258   Weight: 111 kg (245 lb) 113 kg (250 lb) 113 kg (250 lb)     Body mass index is 41.6 kg/m².    Intake/Output Summary (Last 24 hours) at 2020 1350  Last data filed at 2020 0700  Gross per 24 hour   Intake 0 ml   Output --   Net 0 ml     Diet Regular; Cardiac  ----------------------------------------------------------------------------------------------------------------------  Physical exam:  Constitutional:    HENT:  Head:  Normocephalic and atraumatic.    Eyes:  Conjunctivae and EOM are normal.  Pupils are equal, round, and reactive to light.  No scleral icterus.    Neck:  Neck supple.  No JVD present.    Cardiovascular: Normal rate, regular rhythm, S1 S2+, NO S3 /  S4  Pulmonary/Chest:  Vesicular breath sounds B/L  Abdominal:  Soft.  Bowel sounds are normal.  No distension and no tenderness.      Neurological:  Alert and oriented to person, place, and time. No focal defecits  Skin:  Skin is warm and dry. No rash noted. No pallor.   Musculoskeletal:  No edema, no tenderness, and no deformity.  No red or swollen joints anywhere.   Peripheral vascular:  2+ Pulses B/L DP  ----------------------------------------------------------------------------------------------------------------------    ----------------------------------------------------------------------------------------------------------------------  Results from last 7 days   Lab Units 12/19/20 0226 12/18/20 2151 12/18/20 1941   TROPONIN T ng/mL <0.010 <0.010 <0.010     Results from last 7 days   Lab Units 12/19/20 0226 12/18/20 2000 12/18/20 1941   CRP mg/dL  --   --  0.95*   LACTATE mmol/L  --  1.8  --    WBC 10*3/mm3 6.46  --  7.73   HEMOGLOBIN g/dL 9.6*  --  10.3*   HEMATOCRIT % 33.0*  --  33.2*   MCV fL 82.3  --  79.2   MCHC g/dL 29.1*  --  31.0*   PLATELETS 10*3/mm3 341  --  366   INR   --   --  1.01         Results from last 7 days   Lab Units 12/19/20 0226 12/18/20  1941   SODIUM mmol/L 139 138   POTASSIUM mmol/L 3.9 4.1   MAGNESIUM mg/dL  --  1.9   CHLORIDE mmol/L 105 102   CO2 mmol/L 25.6 25.7   BUN mg/dL 8 8   CREATININE mg/dL 0.56* 0.64   EGFR IF NONAFRICN AM mL/min/1.73 117 100   CALCIUM mg/dL 8.8 8.9   GLUCOSE mg/dL 117* 164*   ALBUMIN g/dL 3.63 3.86   BILIRUBIN mg/dL 0.2 0.2   ALK PHOS U/L 100 115   AST (SGOT) U/L 17 24   ALT (SGPT) U/L 18 21   Estimated Creatinine Clearance: 159 mL/min (A) (by C-G formula based on SCr of 0.56 mg/dL (L)).    No results found for: AMMONIA  Results from last 7 days   Lab Units 12/19/20 0226   CHOLESTEROL mg/dL 144   TRIGLYCERIDES mg/dL 183*   HDL CHOL mg/dL 27*   LDL CHOL mg/dL 85     No results found for: BLOODCX  No results found for: URINECX  No results found for:  WOUNDCX  No results found for: STOOLCX    I have personally looked at the labs and they are summarized above.  ----------------------------------------------------------------------------------------------------------------------  Imaging Results (Last 24 Hours)     Procedure Component Value Units Date/Time    US Carotid Bilateral [[id number]] Collected: 12/19/20 1201     Updated: 12/19/20 1205    Narrative:      EXAMINATION: US CAROTID BILATERAL-      Technique: Multiple real-time color Doppler images were acquired of  bilateral carotid arteries.     Stenosis measurements if obtained, were performed by the NASCET or  similar method.        CLINICAL INDICATION:     pre-syncope; I20.0-Unstable angina     COMPARISON:    None     FINDINGS:        RIGHT:  Mild plaque right carotid system. No occlusion. Reactive appearing lymph  node is noted.  RIGHT CCA PSV:103 cm/s  RIGHT ICA PSV: 105 cm/s  RIGHT ICA EDV: 31 cm/s  Right ICA/CCA Ratio: 1.0  Anterograde flow is demonstrated in RIGHT vertebral artery.     LEFT:  No significant intimal thickening or plaque is noted. No occlusion.  LEFT CCA PSV: 111 cm/s  LEFT ICA PSV: 130 cm/s  LEFT EDV: 41 cm/s  Left ICA/CCA Ratio: 1.2  Anterograde flow is demonstrated in LEFT vertebral artery.          Impression:      1. Mild plaque right carotid system. No occlusion.  2. No hemodynamically significant stenosis of either RIGHT or LEFT ICA.  3. Antegrade flow noted both vertebral arteries.     This report was finalized on 12/19/2020 12:03 PM by Dr. Javier Aguayo MD.       CT Chest Pulmonary Embolism [[id number]] Collected: 12/18/20 2050     Updated: 12/18/20 2101    Narrative:      EXAM:    CT Angiography Chest With Intravenous Contrast     EXAM DATE:    12/18/2020 8:24 PM     CLINICAL HISTORY:    STEMI vs PE VS aortic dissection     TECHNIQUE:    Axial computed tomographic angiography images of the chest with  intravenous contrast.  This CT exam was performed using one or more of  the  following dose reduction techniques:  automated exposure control,  adjustment of the mA and/or kV according to patient size, and/or use of  iterative reconstruction technique.    MIP reconstructed images were created and reviewed.     COMPARISON:    No relevant prior studies available.     FINDINGS:    Pulmonary arteries:  Exam is non-diagnostic for PE assessment due to  timing of the contrast bolus.    Aorta:  Aorta is of normal caliber with no obvious dissection or  aneurysm.    Lungs:  No pulmonary parenchymal infarct.  No mass.    Pleural space:  No effusion.  No pneumothorax.    Heart:  Mild cardiomegaly.  No significant pericardial effusion.  No  evidence of RV dysfunction.    Bones/joints:  See above.    Soft tissues:  Unremarkable.    Lymph nodes:  Unremarkable.  No enlarged lymph nodes.    Liver:  Fatty liver.    Adrenals:  1.6 cm right adrenal lesion.  Recommend followup with  adrenal protocol CT or MRI.       Impression:      1.  Exam is non-diagnostic for PE assessment due to timing of the  contrast bolus.  2.  Aorta is of normal caliber with no obvious dissection or aneurysm.  3.  1.6 cm right adrenal lesion.  Recommend followup with adrenal  protocol CT or MRI.  4. Fatty liver.  5. Mild cardiomegaly. Mild coronary artery calcifications.     This report was finalized on 12/18/2020 8:59 PM by Dr. Javier Aguayo MD.       XR Chest 1 View [[id number]] Collected: 12/18/20 2022     Updated: 12/18/20 2025    Narrative:      EXAM:    XR Chest, 1 View     EXAM DATE:    12/18/2020 7:48 PM     CLINICAL HISTORY:    Acute STEMI Protocol     TECHNIQUE:    Frontal view of the chest.     COMPARISON:    No relevant prior studies available.     FINDINGS:    Lungs:  Unremarkable.  No consolidation.    Pleural space:  Unremarkable.  No pneumothorax.    Heart:  Unremarkable.  No cardiomegaly.    Mediastinum:  Unremarkable.    Bones/joints:  Unremarkable.       Impression:        Unremarkable exam. No acute cardiopulmonary  findings identified.     This report was finalized on 12/18/2020 8:22 PM by Dr. Javier Aguayo MD.           ----------------------------------------------------------------------------------------------------------------------    Assessment:  Dilated cardiomyopathy, EF 30 to 35%, patient's EF was also very low to 20% when it was initially diagnosed in 2018, official records pending from [facility].  Presyncopal episodes, could be likely secondary to orthostatic hypotension in the setting of her severe cervical stenosis contributing somewhat to her symptoms of upper extremity and facial numbness.  Left bundle branch block, chronic      Plan:  Patient's echo showed a dilated LV with EF of 30 to 35%, contrast showed no definite evidence of LV thrombus.  Discussed with patient regarding further evaluation since his stress test was nondiagnostic showing an anteroseptal and apical septal perfusion defect which is likely from left bundle branch block, she had a history of mild nonobstructive coronary artery disease, according to the patient she had a 30% lesion in her RCA and a 50% lesion in the LAD from a cath almost a year ago.  I explained to the patient the only definitive way of diagnosing for CAD would be to repeat a coronary angiogram, patient's troponin has been negative and she has no chest pain, clearly she does not have an ACS event currently, patient wished that she would follow-up with me in office in 2 weeks and proceed with coronary angiogram as an outpatient which is reasonable.  Unfortunately patient's blood pressure has been borderline low so she would not be able to tolerate a good guideline rated medical management for heart attack cardiomyopathy.  For now we will try with the Coreg 3.125 twice daily and lisinopril 2.5 daily.  Outpatient likely add low-dose of Aldactone if tolerable.  Also discussed with LifeVest/ICD with the patient, she told her she was referred to EP  physician in the past for ICD evaluation, subsequently I assume since her EF improved to more than 40% she was not a candidate, now her EF is less than 35% we will have her wear a LifeVest and reevaluate her LVEF on guideline letter medical management.  D/w Dr Lang        This document has been electronically signed by García Bravo MD   December 19, 2020 13:50 EST    García Bravo MD, [facility]  Interventional Cardiology        12/19/20  13:50 EST       Addendum:  For purpose of WCD:  LV EF of 30-35% from echo contrast study is more accurate representation of pt EF than gated MPI study

## 2020-12-19 NOTE — H&P
"     [facility] Medicine Services  HISTORY & PHYSICAL    Patient Identification:  Name:  Esme Francois  Age:  45 y.o.  Sex:  female  :  1975  MRN:  [id number]   Visit Number:  [id number]  Admit Date: 2020   Primary Care Physician:  Lc Brothers APRN     Subjective     Chief complaint:   Chief Complaint   Patient presents with   • Chest Pain   • Dizziness     History of presenting illness:   Patient is a 45 y.o. female with past medical history significant for ischemic cardiomyopathy, hyperlipidemia, non-obstructing CAD, essential hypertension, seizures, GERD, anxiety, depression, that presented to the [facility] emergency department for evaluation of chest pain and presyncope.    The patient states that she went to her nephew's house for dinner yesterday afternoon when she suddenly began experiencing dizziness and presyncope type symptoms.  The patient reports she was sitting at the table eating ice cream when she felt like her vision began to black out after bending over to eat.  She states that \"everything started to go dark\" so she decided to sit down on the floor.  She denies loss of consciousness and admits she could still hear people talking.  She states that after she regained her vision she began experiencing a headache, substernal chest pressure that radiated to her back, shortness of air, and palpitations.  She states that her chest pressure continued until she arrived to the hospital but improved somewhat after taking an aspirin at home. She denies any recent illness, fever, chills, diaphoresis, coughing, wheezing, leg edema, abdominal pain, nausea, vomiting, diarrhea, facial asymmetry, numbness/tingling, recent seizures, difficulty with speech, weakness.  The patient reports that she currently follows with cardiology in [place], [place] and is unsure when her last appointment was.  She admits to having a stress test in 2019 at the [facility] " "and this year at the [facility] that were unremarkable.  She has a family history significant for her father having coronary artery disease.  In addition, the patient does admit to increased stressors at home as her uncle recently passed away from Covid and her boyfriend was recently taken to residential.  She states that she often feels \"overwhelmed\" but denies any feelings of hopelessness, suicidal or homicidal ideations.    Dr. Stewart, cardiology, was able to review records from the [facility] [place] [facility].  It appears that the patient has a known history of ischemic cardiomyopathy with an EF of less than 35% a year and a half ago.  She has been evaluated at the [facility] where she had a coronary angiogram that showed mild nonobstructive CAD.  The patient was started on goal-directed medical management and her EF improved to 45 to 50%. Dr. Stewart reviewed records from Dr. Matias's office at the [facility] [place] [facility], which stated that the patient was scheduled to get a stress test for intermittent chest pain several months ago but due to Covid it was delayed.  Per Dr. Stewart, the patient's EKGs obtained from from the [facility] [place] [facility] reveals a left bundle branch block in the past.    Upon arrival to the ED, vitals were temperature 99.5 °F, pulse 89, respirations 18, /66, SPO2 98% on room air.  Troponin T negative x2.  CMP with glucose 164.  C-reactive protein 0.95.  CBC with hemoglobin 10.3, hematocrit 33.2, MCH 24.6, MCHC 31.0.  UA unremarkable.  Blood cultures pending x2.  Chest x-ray shows no acute cardiopulmonary findings.  CT of the chest with PE protocol revealed 1.6 cm right adrenal lesion, exam nondiagnostic for PE assessment due to timing of the contrast bolus, reported is of normal caliber no obvious dissection or aneurysm, fatty liver, mild cardiomegaly, mild coronary artery calcifications.  EKG is normal sinus rhythm, left bundle branch block, heart rate 95 " bpm, QTc 532 MS.  COVID-19 negative.    In the emergency department the patient received p.o. aspirin 343 mg, IV heparin bolus, IV morphine 2 mg, p.o. Brilinta.    Patient has been admitted to the observation floor for further evaluation and treatment.  ---------------------------------------------------------------------------------------------------------------------   Review of Systems   Constitutional: Negative for chills, diaphoresis and fever.   HENT: Negative for congestion and sore throat.    Eyes: Negative for discharge and visual disturbance.   Respiratory: Positive for shortness of breath. Negative for cough and wheezing.    Cardiovascular: Positive for chest pain (Substernal pressure with radiation to back) and palpitations. Negative for leg swelling.   Gastrointestinal: Negative for abdominal pain, constipation, diarrhea, nausea and vomiting.   Endocrine: Negative for polydipsia and polyuria.   Genitourinary: Negative for dysuria and frequency.   Musculoskeletal: Negative for back pain and gait problem.   Skin: Negative for rash and wound.   Neurological: Positive for dizziness, seizures (Last seizure a year and a half ago) and headaches. Negative for tremors, syncope, facial asymmetry, speech difficulty, weakness, light-headedness and numbness.   Hematological: Does not bruise/bleed easily.   Psychiatric/Behavioral: Negative for confusion. The patient is not nervous/anxious.       ---------------------------------------------------------------------------------------------------------------------   Past Medical History:   Diagnosis Date   • CAD (coronary artery disease) 12/19/2020    Non obstructive   • Depression    • GERD (gastroesophageal reflux disease)    • Hyperlipidemia    • Hypertension    • Ischemic cardiomyopathy 12/19/2020   • LBBB (left bundle branch block)    • Seizures (CMS/HCC)     last sz was one month ago     Past Surgical History:   Procedure Laterality Date   • APPENDECTOMY  2014   •  CARDIAC CATHETERIZATION     • CHOLECYSTECTOMY  1996   • TUBAL ABDOMINAL LIGATION  2003     Family History   Problem Relation Age of Onset   • Diabetes Mother    • Hypertension Mother    • Breast cancer Mother    • Heart attack Father         x2   • Hypertension Father    • Cancer Maternal Aunt         thyroid cancer   • Cancer Maternal Uncle         colon cancer   • Aneurysm Maternal Grandmother         brain   • Asthma Brother    • Depression Brother    • Scoliosis Brother    • No Known Problems Maternal Grandfather    • No Known Problems Paternal Grandmother    • No Known Problems Paternal Grandfather      Social History     Socioeconomic History   • Marital status:      Spouse name: Not on file   • Number of children: Not on file   • Years of education: Not on file   • Highest education level: Not on file   Tobacco Use   • Smoking status: Current Every Day Smoker     Packs/day: 1.00     Years: 18.00     Pack years: 18.00   • Smokeless tobacco: Never Used   • Tobacco comment: sometimes more   Substance and Sexual Activity   • Alcohol use: Yes     Comment: on occassion    • Drug use: Yes     Types: Marijuana     Comment: every now and then   • Sexual activity: Yes     Partners: Female     ---------------------------------------------------------------------------------------------------------------------   Allergies:  Keflex [cephalexin], Tricor [fenofibrate], Metoprolol, Adhesive tape, and Latex  ---------------------------------------------------------------------------------------------------------------------   Medications below are reported home medications pulling from within the system; at this time, these medications have not been reconciled unless otherwise specified and are in the verification process for further verifcation as current home medications.    Prior to Admission Medications     Prescriptions Last Dose Informant Patient Reported? Taking?    albuterol sulfate  (90 Base) MCG/ACT  inhaler Unknown Pharmacy Yes No    Inhale 2 puffs Every 6 (Six) Hours As Needed for Wheezing.    aspirin 81 MG chewable tablet 12/18/2020 Pharmacy Yes Yes    Chew 81 mg Daily.    atorvastatin (LIPITOR) 80 MG tablet 12/17/2020 Pharmacy Yes Yes    Take 80 mg by mouth Every Night.    carvedilol (COREG) 3.125 MG tablet 12/17/2020 Pharmacy Yes Yes    Take 3.125 mg by mouth 2 (Two) Times a Day With Meals.    Diclofenac Sodium (VOLTAREN) 1 % gel gel Unknown Pharmacy Yes No    Apply 2 g topically to the appropriate area as directed 4 (Four) Times a Day As Needed (Pain).    FLUoxetine (PROzac) 20 MG capsule 12/18/2020 Pharmacy Yes Yes    Take 20 mg by mouth Daily.    hydrOXYzine pamoate (VISTARIL) 25 MG capsule Unknown Pharmacy Yes No    Take 25 mg by mouth 4 (Four) Times a Day As Needed for Anxiety.    lisinopril (PRINIVIL,ZESTRIL) 2.5 MG tablet 12/18/2020 Pharmacy Yes Yes    Take 2.5 mg by mouth Daily.        ---------------------------------------------------------------------------------------------------------------------    Objective     Hospital Scheduled Meds:  heparin (porcine), 5,000 Units, Subcutaneous, Q8H  sodium chloride, 10 mL, Intravenous, Q12H           Current listed hospital scheduled medications may not yet reflect those currently placed in orders that are signed and held, awaiting patient's arrival to floor/unit.    ---------------------------------------------------------------------------------------------------------------------   Vital Signs:  Temp:  [98.1 °F (36.7 °C)-99.5 °F (37.5 °C)] 98.1 °F (36.7 °C)  Heart Rate:  [70-92] 72  Resp:  [18-20] 20  BP: (112-148)/(63-93) 134/85  Mean Arterial Pressure (Non-Invasive) for the past 24 hrs (Last 3 readings):   Noninvasive MAP (mmHg)   12/19/20 0030 98   12/18/20 2314 100   12/18/20 2232 91     SpO2 Percentage    12/18/20 2232 12/18/20 2245 12/18/20 2314   SpO2: 99% 100% 100%     SpO2:  [95 %-100 %] 100 %  on  Flow (L/min):  [2] 2;   Device (Oxygen  Therapy): room air    Body mass index is 41.6 kg/m².  Wt Readings from Last 3 Encounters:   12/18/20 113 kg (250 lb)   05/26/17 101 kg (223 lb 9.6 oz)   09/07/16 109 kg (240 lb)     ---------------------------------------------------------------------------------------------------------------------   Physical Exam:  Physical Exam  Constitutional:       General: She is awake.      Appearance: Normal appearance. She is well-developed. She is obese.   HENT:      Head: Normocephalic and atraumatic.   Eyes:      General: Lids are normal.         Right eye: No discharge.         Left eye: No discharge.   Cardiovascular:      Rate and Rhythm: Normal rate and regular rhythm.      Pulses: Normal pulses. No decreased pulses.           Dorsalis pedis pulses are 2+ on the right side and 2+ on the left side.        Posterior tibial pulses are 2+ on the right side and 2+ on the left side.      Heart sounds: Normal heart sounds. No murmur. No friction rub. No gallop.    Pulmonary:      Effort: Pulmonary effort is normal. No tachypnea, bradypnea or respiratory distress.      Breath sounds: Normal breath sounds. No decreased breath sounds, wheezing, rhonchi or rales.   Abdominal:      General: Bowel sounds are normal.      Palpations: Abdomen is soft.      Tenderness: There is no abdominal tenderness.   Musculoskeletal:      Right lower leg: No edema.      Left lower leg: No edema.   Skin:     Findings: No abrasion, ecchymosis or erythema.   Neurological:      General: No focal deficit present.      Mental Status: She is alert and oriented to person, place, and time. Mental status is at baseline.      Sensory: Sensation is intact.      Motor: Motor function is intact. No weakness or tremor.   Psychiatric:         Speech: Speech normal.         Behavior: Behavior is cooperative.         Thought Content: Thought content normal. Thought content does not include homicidal or suicidal ideation.         Cognition and Memory: Cognition  normal.       ---------------------------------------------------------------------------------------------------------------------  EKG:    Pending cardiology read.  My evaluation, initial EKG with normal sinus rhythm, left bundle branch block, heart rate 95 bpm, QTc 532 MS.  Repeat EKG with normal sinus rhythm, possible left atrial enlargement, left bundle branch block, ST depression in lead II, heart rate 72 bpm, QTc 576 MS.    Telemetry:    Patient is not currently on telemetry.    I have personally reviewed the EKG  ---------------------------------------------------------------------------------------------------------------------   Results from last 7 days   Lab Units 12/18/20 2151 12/18/20 1941   TROPONIN T ng/mL <0.010 <0.010     Results from last 7 days   Lab Units 12/18/20 1941   PROBNP pg/mL 369.7         Results from last 7 days   Lab Units 12/18/20 2000 12/18/20 1941   CRP mg/dL  --  0.95*   LACTATE mmol/L 1.8  --    WBC 10*3/mm3  --  7.73   HEMOGLOBIN g/dL  --  10.3*   HEMATOCRIT %  --  33.2*   MCV fL  --  79.2   MCHC g/dL  --  31.0*   PLATELETS 10*3/mm3  --  366   INR   --  1.01     Results from last 7 days   Lab Units 12/18/20 1941   SODIUM mmol/L 138   POTASSIUM mmol/L 4.1   MAGNESIUM mg/dL 1.9   CHLORIDE mmol/L 102   CO2 mmol/L 25.7   BUN mg/dL 8   CREATININE mg/dL 0.64   EGFR IF NONAFRICN AM mL/min/1.73 100   CALCIUM mg/dL 8.9   GLUCOSE mg/dL 164*   ALBUMIN g/dL 3.86   BILIRUBIN mg/dL 0.2   ALK PHOS U/L 115   AST (SGOT) U/L 24   ALT (SGPT) U/L 21   Estimated Creatinine Clearance: 139.1 mL/min (by C-G formula based on SCr of 0.64 mg/dL).  No results found for: AMMONIA    No results found for: HGBA1C, POCGLU  No results found for: HGBA1C  Lab Results   Component Value Date    TSH 0.851 12/18/2020     Pain Management Panel     There is no flowsheet data to display.        I have personally reviewed the above laboratory results.      ---------------------------------------------------------------------------------------------------------------------  Imaging Results (Last 7 Days)     Procedure Component Value Units Date/Time    CT Chest Pulmonary Embolism [[id number]] Collected: 12/18/20 2050     Updated: 12/18/20 2101    Narrative:      EXAM:    CT Angiography Chest With Intravenous Contrast     EXAM DATE:    12/18/2020 8:24 PM     CLINICAL HISTORY:    STEMI vs PE VS aortic dissection     TECHNIQUE:    Axial computed tomographic angiography images of the chest with  intravenous contrast.  This CT exam was performed using one or more of  the following dose reduction techniques:  automated exposure control,  adjustment of the mA and/or kV according to patient size, and/or use of  iterative reconstruction technique.    MIP reconstructed images were created and reviewed.     COMPARISON:    No relevant prior studies available.     FINDINGS:    Pulmonary arteries:  Exam is non-diagnostic for PE assessment due to  timing of the contrast bolus.    Aorta:  Aorta is of normal caliber with no obvious dissection or  aneurysm.    Lungs:  No pulmonary parenchymal infarct.  No mass.    Pleural space:  No effusion.  No pneumothorax.    Heart:  Mild cardiomegaly.  No significant pericardial effusion.  No  evidence of RV dysfunction.    Bones/joints:  See above.    Soft tissues:  Unremarkable.    Lymph nodes:  Unremarkable.  No enlarged lymph nodes.    Liver:  Fatty liver.    Adrenals:  1.6 cm right adrenal lesion.  Recommend followup with  adrenal protocol CT or MRI.       Impression:      1.  Exam is non-diagnostic for PE assessment due to timing of the  contrast bolus.  2.  Aorta is of normal caliber with no obvious dissection or aneurysm.  3.  1.6 cm right adrenal lesion.  Recommend followup with adrenal  protocol CT or MRI.  4. Fatty liver.  5. Mild cardiomegaly. Mild coronary artery calcifications.     This report was finalized on 12/18/2020 8:59 PM by  Dr. Javier Aguayo MD.       XR Chest 1 View [[id number]] Collected: 12/18/20 2022     Updated: 12/18/20 2025    Narrative:      EXAM:    XR Chest, 1 View     EXAM DATE:    12/18/2020 7:48 PM     CLINICAL HISTORY:    Acute STEMI Protocol     TECHNIQUE:    Frontal view of the chest.     COMPARISON:    No relevant prior studies available.     FINDINGS:    Lungs:  Unremarkable.  No consolidation.    Pleural space:  Unremarkable.  No pneumothorax.    Heart:  Unremarkable.  No cardiomegaly.    Mediastinum:  Unremarkable.    Bones/joints:  Unremarkable.       Impression:        Unremarkable exam. No acute cardiopulmonary findings identified.     This report was finalized on 12/18/2020 8:22 PM by Dr. Javier Aguayo MD.           I have personally reviewed the above radiology results.      ---------------------------------------------------------------------------------------------------------------------    Assessment & Plan      Active Hospital Problems    Diagnosis POA   • **Chest pain [R07.9] Yes   • Seizures (CMS/HCC) [R56.9] Yes   • Hyperlipidemia [E78.5] Yes   • Essential hypertension [I10] Yes   • Ischemic cardiomyopathy [I25.5] Yes   • CAD (coronary artery disease) [I25.10] Yes   • LBBB (left bundle branch block) [I44.7] Yes   • Depression [F32.9] Yes   • GERD (gastroesophageal reflux disease) [K21.9] Yes     #Chest pain present upon admission with typical features  #Nonobstructive coronary artery disease  #Hyperlipidemia  #Essential hypertension  #Ischemic cardiomyopathy  #Chronic left bundle branch  #Tobacco use   -Currently chest pain-free  -Troponin T negative x2  -CT of chest with PE protocol was non-diagnostic for PE due to timing of the contrast bolus; aorta of normal caliber with no obvious dissection or aneurysm  -Initially, there was concern of a new left bundle branch block therefore, the patient received IV heparin bolus, Brilinta, and aspirin in the emergency department; Dr. Stewart was able to obtain  records from [facility], however, that revealed the left bundle branch block is chronic  -We will continue to trend troponin and obtain serial EKGs  -Lexiscan stress test ordered for AM; patient is to be NPO   -Obtain transthoracic echo  -Monitor for signs of volume overload with daily weights, strict I's and O's  -Fasting a.m. lipid panel  -Review home medications once available  -Obtain records from [facility] and [facility]   -Monitor closely on telemetry    #Pre-Syncope  -Etiology unclear at this time  -No focal neurological deficits at this time  -Obtain transthoracic echo, bilateral carotid US, and orthostatic vitals  -TSH is within normal limits   -Fall precautions in place     #Hypomagnesemia  -Magnesium 1.9 on admission   -Magnesium replacement protocol ordered     #Normocytic anemia   -H&H stable; repeat a.m. CBC  -Obtain vitamin B12, folate, ferritin, iron, iron panel; replace as necessary  -If hemoglobin less than 7 will transfuse    #1.6 cm right adrenal lesion  -No prior CT to compare to  -Consider follow-up adrenal protocol CT or MRI     #History of complex partial seizures  -Patient reports last seizure was 1.5 year ago   -Patient states she followed with Dr. Woodard, neurology, in the past but is not currently seeing neurology; also not currently taking any anti-epileptic medication   -Seizure precautions in place    #KENDALL on CPAP   -CPAP ordered     #GERD  -Protonix    #Anxiety  #Depression  -Supportive care  - review home medications once available    #Morbid obesity  -BMI 41.60 kg/m2     F/E/N: No IV fluids.  Replace electrolytes as necessary.  N.p.o.  ---------------------------------------------------  DVT Prophylaxis: Subcutaneous heparin  GI Prophylaxis: Protonix  Activity: Up with assistance, fall precautions    OBSERVATION status, however if further evaluation or treatment plans warrant, status may change.  Based upon current information, I predict  patient's care encounter to be less than or equal to 2 midnights.    Code Status: FULL CODE     I have discussed the patient's assessment and plan with the patient, nursing staff, and attending physician      Renetta Garnica PA-C  Hospitalist Service -- [facility]       12/19/20  00:35 EST    Attending Physician: Rose Marie Le, DO

## 2020-12-19 NOTE — NURSING NOTE
Pt removed telemetry box.  Instructed pt the importance of keeping it on to help monitor her heart rate, rhythm and oxygen.  Pt verbalized understanding but said she still wanted it off.  Dr Ng made aware.

## 2020-12-19 NOTE — ED NOTES
REBECCAG completed @ 1929 and given to Dr. Parsons @ 1931     Mildred Childress, PCT  12/18/20 1932

## 2020-12-19 NOTE — ED NOTES
[facility] spoke with Dexter and requested Cath report and EKG from approximately 2 years. He requested release faxed to [phone].       Symes, Heather  12/18/20 1953

## 2020-12-19 NOTE — ED NOTES
Dr. Stewart is in the ER speaking with Dr. Parsons and patient.      Symes, Heather  12/19/20 0619

## 2020-12-19 NOTE — ED NOTES
ONEPUSH activated. Notified house supervisor Yaritza and bed board Lauren.      Symes, Heather  12/18/20 1951

## 2020-12-19 NOTE — ED NOTES
Pt gave verbal consent for CT with contrast witnessed by myself, Rajinder Anders, MACHO and Arik Carrington. Consent placed on Pt chart.      Mildred Matamoros RN  12/18/20 1988

## 2020-12-19 NOTE — ED NOTES
Pt clipped and prepped. Radio-translucent de-fib pads in place. Pt placed on zolls monitor for continuous monitoring.      Mildred Matamoros RN  12/18/20 2003

## 2020-12-19 NOTE — ED NOTES
Per Dr. Parsons Pt is to be taken to CT at this time, verified that Pt is awaiting cath lab and states to proceed with CT scan. Pt continues to complain of chest pain, dizziness, provider made aware. Pt remains on zolls monitor, transported to CT by [facility] with RN at bedside.      Mildred Matamoros, RN  12/18/20 9924

## 2020-12-19 NOTE — ED NOTES
Notified house supervisor Ramirez that the Stemi is canceled per Dr. Stewart.      Symes, Heather  12/19/20 0621

## 2020-12-19 NOTE — CONSULTS
"Consults    Patient Identification:    Name:  Esme Francois  Age:  45 y.o.  Sex:  female  :  1975  MRN:  [id number]  Visit Number:  [id number]  Primary care provider:  Lc Brothers APRN    Chief complaint:   Syncope    History of presenting illness:   Patient is 45-year-old female with a known history of ischemic cardiomyopathy with EF of less than 35% a year and a half ago, she has been evaluated at [facility], she said she had a coronary angiogram which showed mild nonobstructive CAD, she was started on goal-directed medical management and her EF subsequently improved about 45 to 50%, reviewed records from Dr. Matias's office at [facility], she said she follows up with cardiologist at [place] and they were supposed to get a stress test on her few months ago but due to Covid it was delayed, she has been having atypical chest pain intermittent over the past few months, today she presented to the ER after she had \"\" syncopal event, patient states she was eating some ice cream, she bent over to eat and she suddenly felt her vision blacking out and she thought she passed out like for few seconds and then she subsequently regained consciousness and she felt chest pain/pressure, she also felt uneasiness with her breathing and swallowing.  Patient said she had similar episodes in the past, she was told that like she had mini strokes, she was also told that she was supposed to get an MRI of the brain to evaluate for possible neurological event, which has not been done so far per patient.  In the emergency room her initial EKG showed sinus rhythm with a left bundle branch block, since she had syncope and chest pain after syncope and new left bundle branch block I was called in for possible ACS event, I then reviewed the records from  and she had a left bundle branch block in the past after reviewing her EKGs at .  I came and saw the patient and she did not appear to " be in any distress, she said she had no chest pain she just has some pressure-like feeling, she said she is also under a lot of stress lately because her uncle passed away yesterday from Covid, she also had issues with her boyfriend who is in the care home so she felt this could all be related to her anxiety and stress.  I also wanted to get a CT scan of her chest to rule out PE and aortic pathology considering her age.  CT PE was done which was nondiagnostic for PE due to timing of contrast bolus, no obvious aortic pathology noted including dissection or aneurysm, she was noted to have mild calcification in her coronary arteries and mild cardiomegaly.     ---------------------------------------------------------------------------------------------------------------------  Review of Systems   Pertinent positive ROS mentioned above in HPI.  ---------------------------------------------------------------------------------------------------------------------   Past History:   Family History   Problem Relation Age of Onset   • Diabetes Mother    • Hypertension Mother    • Breast cancer Mother    • Heart attack Father         x2   • Hypertension Father    • Cancer Maternal Aunt         thyroid cancer   • Cancer Maternal Uncle         colon cancer   • Aneurysm Maternal Grandmother         brain   • Asthma Brother    • Depression Brother    • Scoliosis Brother    • No Known Problems Maternal Grandfather    • No Known Problems Paternal Grandmother    • No Known Problems Paternal Grandfather      Past Medical History:   Diagnosis Date   • Anemia    • Depression    • GERD (gastroesophageal reflux disease)    • H/O ganglion cyst     right wrist, feet   • H/O mammogram 12/2015   • Leg cramps    • Muscle pain    • Myocardial infarction (CMS/HCC)    • Painful joint    • Pap smear for cervical cancer screening     has not had one in forever   • Seizures (CMS/HCC)     last sz was one month ago     Past Surgical History:   Procedure  Laterality Date   • APPENDECTOMY  2014   • CARDIAC CATHETERIZATION     • CHOLECYSTECTOMY  1996   • TUBAL ABDOMINAL LIGATION  2003     Social History     Socioeconomic History   • Marital status:      Spouse name: Not on file   • Number of children: Not on file   • Years of education: Not on file   • Highest education level: Not on file   Tobacco Use   • Smoking status: Current Every Day Smoker     Packs/day: 1.00     Years: 18.00     Pack years: 18.00   • Smokeless tobacco: Never Used   • Tobacco comment: sometimes more   Substance and Sexual Activity   • Alcohol use: Yes     Comment: on occassion    • Drug use: Yes     Types: Marijuana     Comment: every now and then   • Sexual activity: Yes     Partners: Female     ---------------------------------------------------------------------------------------------------------------------   Allergies:  Keflex [cephalexin], Tricor [fenofibrate], Metoprolol, Adhesive tape, and Latex  ---------------------------------------------------------------------------------------------------------------------   Prior to Admission Medications     Prescriptions Last Dose Informant Patient Reported? Taking?    ADVAIR DISKUS 250-50 MCG/DOSE DISKUS   No No    Inhale 1 puff 2 (two) times a day.    clotrimazole (LOTRIMIN) 1 % cream   No No    Apply  topically 2 (two) times a day.    hydrOXYzine (ATARAX) 25 MG tablet   No No    Take 1 tablet by mouth 3 (Three) Times a Day As Needed for Anxiety.    naproxen (NAPROSYN) 500 MG tablet   No No    Take 1 tablet by mouth 2 (Two) Times a Day With Meals.    sertraline (ZOLOFT) 50 MG tablet   No No    Take 1 tablet by mouth Daily.    VENTOLIN  (90 BASE) MCG/ACT inhaler   No No    Inhale 2 puffs every 6 (six) hours as needed for wheezing.        [facility] Meds:        ---------------------------------------------------------------------------------------------------------------------   Vital Signs:  Temp:  [99.5 °F (37.5 °C)] 99.5 °F  (37.5 °C)  Heart Rate:  [82-92] 82  Resp:  [18] 18  BP: (123-148)/(66-93) 141/87      12/18/20 1936   Weight: 111 kg (245 lb)     Body mass index is 40.77 kg/m².  ---------------------------------------------------------------------------------------------------------------------   Physical exam:   Constitutional:  Well-developed and well-nourished.  No respiratory distress.      HENT:  Head: Normocephalic and atraumatic.  Mouth:  Moist mucous membranes.    Eyes:  Conjunctivae and EOM are normal.  Pupils are equal, round, and reactive to light.  No scleral icterus.  Neck:  Neck supple.  No JVD present.    Cardiovascular:  Normal rate, regular rhythm and normal heart sounds with no murmur.  Pulmonary/Chest:  No respiratory distress, no wheezes, no crackles, with normal breath sounds and good air movement.  Abdominal:  Soft.  Bowel sounds are normal.  No distension and no tenderness.   Musculoskeletal:  No edema, no tenderness, and no deformity.  No red or swollen joints anywhere.    Neurological:  Alert and oriented to person, place, and time.  No cranial nerve deficit.  No tongue deviation.  No facial droop.  No slurred speech.   Skin:  Skin is warm and dry.  No rash noted.  No pallor.   Psychiatric:  Normal mood and affect.  Behavior is normal.  Judgment and thought content normal.   Peripheral vascular:  No edema and strong pulses on all 4 extremities.    ---------------------------------------------------------------------------------------------------------------------   EKG: Sinus rhythm with left bundle branch block  Telemetry: Sinus tachycardia  I have personally looked at both the EKG and the telemetry strips.  Echo:     ---------------------------------------------------------------------------------------------------------------------   Results from last 7 days   Lab Units 12/18/20 2000 12/18/20  1941   CRP mg/dL  --  0.95*   LACTATE mmol/L 1.8  --    WBC 10*3/mm3  --  7.73   HEMOGLOBIN g/dL  --  10.3*      HEMATOCRIT %  --  33.2*   MCV fL  --  79.2   MCHC g/dL  --  31.0*   PLATELETS 10*3/mm3  --  366   INR   --  1.01         Results from last 7 days   Lab Units 12/18/20 1941   SODIUM mmol/L 138   POTASSIUM mmol/L 4.1   MAGNESIUM mg/dL 1.9   CHLORIDE mmol/L 102   CO2 mmol/L 25.7   BUN mg/dL 8   CREATININE mg/dL 0.64   EGFR IF NONAFRICN AM mL/min/1.73 100   CALCIUM mg/dL 8.9   PHOSPHORUS mg/dL 3.3   GLUCOSE mg/dL 164*   ALBUMIN g/dL 3.86   BILIRUBIN mg/dL 0.2   ALK PHOS U/L 115   AST (SGOT) U/L 24   ALT (SGPT) U/L 21   Estimated Creatinine Clearance: 137.7 mL/min (by C-G formula based on SCr of 0.64 mg/dL).  No results found for: AMMONIA  Results from last 7 days   Lab Units 12/18/20 1941   TROPONIN T ng/mL <0.010     Results from last 7 days   Lab Units 12/18/20 1941   PROBNP pg/mL 369.7     No results found for: HGBA1C  Lab Results   Component Value Date    TSH 0.851 12/18/2020     No results found for: PREGTESTUR, PREGSERUM, HCG, HCGQUANT  Pain Management Panel     There is no flowsheet data to display.        No results found for: BLOODCX  No results found for: URINECX  No results found for: WOUNDCX  No results found for: STOOLCX        ---------------------------------------------------------------------------------------------------------------------   Imaging Results (Last 7 Days)     Procedure Component Value Units Date/Time    CT Chest Pulmonary Embolism [[id number]] Collected: 12/18/20 2050     Updated: 12/18/20 2101    Narrative:      EXAM:    CT Angiography Chest With Intravenous Contrast     EXAM DATE:    12/18/2020 8:24 PM     CLINICAL HISTORY:    STEMI vs PE VS aortic dissection     TECHNIQUE:    Axial computed tomographic angiography images of the chest with  intravenous contrast.  This CT exam was performed using one or more of  the following dose reduction techniques:  automated exposure control,  adjustment of the mA and/or kV according to patient size, and/or use of  iterative reconstruction  technique.    MIP reconstructed images were created and reviewed.     COMPARISON:    No relevant prior studies available.     FINDINGS:    Pulmonary arteries:  Exam is non-diagnostic for PE assessment due to  timing of the contrast bolus.    Aorta:  Aorta is of normal caliber with no obvious dissection or  aneurysm.    Lungs:  No pulmonary parenchymal infarct.  No mass.    Pleural space:  No effusion.  No pneumothorax.    Heart:  Mild cardiomegaly.  No significant pericardial effusion.  No  evidence of RV dysfunction.    Bones/joints:  See above.    Soft tissues:  Unremarkable.    Lymph nodes:  Unremarkable.  No enlarged lymph nodes.    Liver:  Fatty liver.    Adrenals:  1.6 cm right adrenal lesion.  Recommend followup with  adrenal protocol CT or MRI.       Impression:      1.  Exam is non-diagnostic for PE assessment due to timing of the  contrast bolus.  2.  Aorta is of normal caliber with no obvious dissection or aneurysm.  3.  1.6 cm right adrenal lesion.  Recommend followup with adrenal  protocol CT or MRI.  4. Fatty liver.  5. Mild cardiomegaly. Mild coronary artery calcifications.     This report was finalized on 12/18/2020 8:59 PM by Dr. Javier Aguayo MD.       XR Chest 1 View [[id number]] Collected: 12/18/20 2022     Updated: 12/18/20 2025    Narrative:      EXAM:    XR Chest, 1 View     EXAM DATE:    12/18/2020 7:48 PM     CLINICAL HISTORY:    Acute STEMI Protocol     TECHNIQUE:    Frontal view of the chest.     COMPARISON:    No relevant prior studies available.     FINDINGS:    Lungs:  Unremarkable.  No consolidation.    Pleural space:  Unremarkable.  No pneumothorax.    Heart:  Unremarkable.  No cardiomegaly.    Mediastinum:  Unremarkable.    Bones/joints:  Unremarkable.       Impression:        Unremarkable exam. No acute cardiopulmonary findings identified.     This report was finalized on 12/18/2020 8:22 PM by Dr. Javier Aguayo MD.                ----------------------------------------------------------------------------------------------------------------------  Assessment and Plan:  Syncope, patient had history of syncopal/presyncopal episodes in the past which was felt to be due to neurological events.  Patient has a known history of cardiomyopathy, nonischemic, she had mild nonobstructive CAD from cath a year and a half ago, her EF subsequently improved to 45 to 50% on medication.  She said she takes carvedilol, lisinopril and Lipitor.  Patient's CT scan did not show any acute aortic pathology including dissection or aneurysm.  Her initial sets of troponins have been negative.  At this time I would recommend to repeat her troponins.  Due to the initial concern that the left bundle branch could be new I did recommend them to give a heparin bolus and 20 mg of Brilinta as a part of ACS, but since the left bundle branch block has been there before I do not feel this is an ACS event unless her troponins trend up in the subsequent labs.  I also recommended to hold off for further IV heparin at this time.  Patient will likely need inpatient admission, will get an transthoracic echocardiogram in the morning for evaluation of her cardiomyopathy, since patient had atypical chest pain and if her troponins are negative we will also probably get a Lexiscan Cardiolite stress test in the morning for evaluation of chest pain.  Patient would need neurological evaluation for her symptoms.  The above plan was discussed with the ER physician Dr. Steen.    Thank you for the consult.      This document has been electronically signed by García Bravo MD   December 18, 2020 21:13 VAUGHN Bravo MD, [facility]  Interventional Cardiology      12/18/20  21:13 EST

## 2020-12-19 NOTE — PLAN OF CARE
Problem: Adult Inpatient Plan of Care  Goal: Plan of Care Review  Outcome: Ongoing, Progressing  Flowsheets (Taken 12/19/2020 0352)  Progress: improving  Plan of Care Reviewed With: patient  Outcome Summary: PT admitted from ER on 12/18. PT is A/Ox4, independent. PT complained of minor chest pain (2 out of 10) upon arrival. EKG done while in room. No change since PT was in ER. PT has been NPO since midnight for Echo/Stress test today. Orthostatic BP obtained and tolerated well. VSS. Afebrile. Will Continue to monitor.  Goal: Patient-Specific Goal (Individualized)  Outcome: Ongoing, Progressing  Goal: Absence of Hospital-Acquired Illness or Injury  Outcome: Ongoing, Progressing  Intervention: Identify and Manage Fall Risk  Recent Flowsheet Documentation  Taken 12/19/2020 0300 by Kayla Veiira RN  Safety Promotion/Fall Prevention:   activity supervised   assistive device/personal items within reach   clutter free environment maintained   fall prevention program maintained   nonskid shoes/slippers when out of bed   room organization consistent   safety round/check completed  Taken 12/19/2020 0100 by Kayla Vieira, RN  Safety Promotion/Fall Prevention:   assistive device/personal items within reach   activity supervised   clutter free environment maintained   fall prevention program maintained   nonskid shoes/slippers when out of bed   room organization consistent   safety round/check completed  Taken 12/18/2020 2300 by Kayla Vieira, RN  Safety Promotion/Fall Prevention:   activity supervised   clutter free environment maintained   assistive device/personal items within reach   nonskid shoes/slippers when out of bed   room organization consistent   safety round/check completed  Intervention: Prevent Skin Injury  Recent Flowsheet Documentation  Taken 12/18/2020 2300 by Kayla Vieira, RN  Body Position: position changed independently  Intervention: Prevent and Manage VTE (venous thromboembolism) Risk  Recent  Flowsheet Documentation  Taken 12/18/2020 2300 by Kayla Vieira RN  VTE Prevention/Management: (See Mar) other (see comments)  Intervention: Prevent Infection  Recent Flowsheet Documentation  Taken 12/19/2020 0300 by Kayla Vieira RN  Infection Prevention: rest/sleep promoted  Taken 12/19/2020 0100 by Kayla Vieira RN  Infection Prevention: rest/sleep promoted  Taken 12/18/2020 2300 by Kayla Vieira RN  Infection Prevention: rest/sleep promoted  Goal: Optimal Comfort and Wellbeing  Outcome: Ongoing, Progressing  Intervention: Provide Person-Centered Care  Recent Flowsheet Documentation  Taken 12/18/2020 2300 by Kayla Vieira RN  Trust Relationship/Rapport:   empathic listening provided   choices provided   care explained   thoughts/feelings acknowledged  Goal: Readiness for Transition of Care  Outcome: Ongoing, Progressing  Intervention: Mutually Develop Transition Plan  Recent Flowsheet Documentation  Taken 12/18/2020 2305 by Kayla Vieira RN  Transportation Anticipated: family or friend will provide  Patient/Family Anticipated Services at Transition: none  Patient/Family Anticipates Transition to: home with family  Taken 12/18/2020 2304 by Kayla Vieira RN  Equipment Currently Used at Home: bipap  Taken 12/18/2020 2255 by Kayla Vieira RN  Equipment Currently Used at Home: bipap  Transportation Anticipated: family or friend will provide  Patient/Family Anticipates Transition to: home with family     Problem: Chest Pain  Goal: Resolution of Chest Pain Symptoms  Outcome: Ongoing, Progressing   Goal Outcome Evaluation:  Plan of Care Reviewed With: patient  Progress: improving  Outcome Summary: PT admitted from ER on 12/18. PT is A/Ox4, independent. PT complained of minor chest pain (2 out of 10) upon arrival. EKG done while in room. No change since PT was in ER. PT has been NPO since midnight for Echo/Stress test today. Orthostatic BP obtained and tolerated well. VSS. Afebrile. Will Continue  to monitor.

## 2020-12-19 NOTE — ED NOTES
Dr. Stewart spoke with Dr. Buenrostro and Dr. Parsons. Stemi canceled at this time.      Symes, Heather  12/19/20 9451

## 2020-12-20 NOTE — PLAN OF CARE
Goal Outcome Evaluation:  Plan of Care Reviewed With: patient  Progress: improving  Patient discharged home. IV and tele removed. Patient left by self, refused staff assistance. Patient left via private vehicle. Patient was educated about life vest representative coming to her home the morning of 12/20/2020 to fit her for life vest. No signs of distress noted.

## 2020-12-20 NOTE — DISCHARGE INSTRUCTIONS
You are being discharged home after your stress test was negative however echo showed recurrence of reduction in your ejection fraction.  Due to this cardiology will see you in your office in 2 weeks for likely outpatient left heart cath.  In addition you will wear a LifeVest, unfortunately this was not able to be delivered to the hospital this evening before your discharge but will be delivered tomorrow at home.

## 2020-12-21 NOTE — PROGRESS NOTES
Case Management Discharge Note      Final Note: Patient discharge home 12/19/20.  Life Vest representative to go to patient's home on 12/20/20 to be fitted.         Selected Continued Care - Discharged on 12/19/2020 Admission date: 12/18/2020 - Discharge disposition: Home or Self Care    Destination    No services have been selected for the patient.              Durable Medical Equipment    No services have been selected for the patient.              Dialysis/Infusion    No services have been selected for the patient.              Home Medical Care    No services have been selected for the patient.              Therapy    No services have been selected for the patient.              Community Resources    No services have been selected for the patient.                       Final Discharge Disposition Code: 01 - home or self-care

## 2020-12-21 NOTE — DISCHARGE SUMMARY
[facility] HOSPITALISTS DISCHARGE SUMMARY    Patient Identification:  Name:  Esme Francois  Age:  45 y.o.  Sex:  female  :  1975  MRN:  [id number]  Visit Number:  [id number]    Date of Admission: 2020  Date of Discharge:  2020    PCP: Lc Brothers APRN    DISCHARGE DIAGNOSIS    Chest pain present upon admission with typical features  Nonobstructive coronary artery disease  Hyperlipidemia  Essential hypertension  Non-Ischemic cardiomyopathy  Chronic left bundle branch  Tobacco use   Pre-Syncope  Hypomagnesemia  Normocytic anemia   History of complex partial seizures  KENDALL on CPAP   GERD  Anxiety  Depression  Morbid obesity  CONSULTS     Dr. Sanchez  PROCEDURES PERFORMED      HOSPITAL COURSE  Patient is a 45 y.o. female presented to [facility] complaining of chest pain and dizziness.  Please see the admitting history and physical for further details.      Patient admitted to hospitalist service for evaluation of chest pain and dizziness.  By time of initial evaluation at admission patient was chest pain free and remained so throughout the day. CT PE was nondiagnostic due to timing of contrast bolus.  Patient with chronic left bundle bunch block, [facility] records obtained and reviewed by Cardiology in consultation.  Recommended proceeding with stress testing and transthoracic echocardiogram.  Patient initially candidate for AICD in past but had improvement in EF with medical therapy however repeat echocardiogram obtained in the hospital with newly reduced EF of 30 to 35% with no LV thrombus seen. Stress testing was nondiagnostic as there was an ateroseptal and apicl septal perfusion defect likely from LBBB.  Cardiology discussed with patient the findings and patient wished to follow up in the office for outpatient left heart cath.  Patient pressure borderline and given presyncope medical regimen unable to be titrated up and remained on Coreg 3.125 and lisinopril 2.5.  Due  to newly decreased EF, lifevest was ordered and arranged for delivery to home at time of her discharge due to lateness of the evening and limited transportation.     VITAL SIGNS:        on   ;        Body mass index is 41.6 kg/m².  Wt Readings from Last 3 Encounters:   12/19/20 113 kg (250 lb)   05/26/17 101 kg (223 lb 9.6 oz)   09/07/16 109 kg (240 lb)       PHYSICAL EXAM:  Constitutional:  Well-developed and well-nourished.  No respiratory distress.      HENT:  Head:  Normocephalic and atraumatic.  Mouth:  Moist mucous membranes.    Eyes:  Conjunctivae and EOM are normal.  No scleral icterus.    Neck:  Neck supple.  No JVD present.    Cardiovascular:  Normal rate, regular rhythm and normal heart sounds with no murmur.  Pulmonary/Chest:  No respiratory distress, no wheezes, no crackles, with normal breath sounds and good air movement.  Abdominal:  Soft.  Bowel sounds are normal.  No distension and no tenderness.   Musculoskeletal:  No edema, no tenderness, and no deformity.  No red or swollen joints anywhere.    Neurological:  Alert and oriented to person, place, and time.  No cranial nerve deficit.  No tongue deviation.  No facial droop.  No slurred speech.   Skin:  Skin is warm and dry. No rash noted. No pallor.   Peripheral vascular: Pulses in all 4 extremities with no clubbing, no cyanosis, no edema.    DISCHARGE DISPOSITION   Stable    DISCHARGE MEDICATIONS:     Discharge Medications      Continue These Medications      Instructions Start Date   albuterol sulfate  (90 Base) MCG/ACT inhaler  Commonly known as: PROVENTIL HFA;VENTOLIN HFA;PROAIR HFA   2 puffs, Inhalation, Every 6 Hours PRN      aspirin 81 MG chewable tablet   81 mg, Oral, Daily      atorvastatin 80 MG tablet  Commonly known as: LIPITOR   80 mg, Oral, Nightly      carvedilol 3.125 MG tablet  Commonly known as: COREG   3.125 mg, Oral, 2 Times Daily With Meals      Diclofenac Sodium 1 % gel gel  Commonly known as: VOLTAREN   2 g, Topical, 4  Times Daily PRN      FLUoxetine 20 MG capsule  Commonly known as: PROzac   20 mg, Oral, Daily      hydrOXYzine pamoate 25 MG capsule  Commonly known as: VISTARIL   25 mg, Oral, 4 Times Daily PRN      lisinopril 2.5 MG tablet  Commonly known as: PRINIVIL,ZESTRIL   2.5 mg, Oral, Daily               Additional Instructions for the Follow-ups that You Need to Schedule     Discharge Follow-up with PCP   As directed       Currently Documented PCP:    Lc Brothers APRN    PCP Phone Number:    [phone]     Follow Up Details: 1 week         Discharge Follow-up with Specialty: Cardiology; 2 Weeks   As directed      Specialty: Cardiology    Follow Up: 2 Weeks    Follow Up Details: Dr. Stewart           Follow-up Information     Lc Brothers APRN .    Specialty: Family Medicine  Why: 1 week  Contact information:  [address] [place], [place] [id number] [phone]                    TEST  RESULTS PENDING AT DISCHARGE  Pending Labs     Order Current Status    Blood Culture - Blood, Arm, Left Preliminary result    Blood Culture - Blood, Arm, Right Preliminary result           CODE STATUS  Code Status and Medical Interventions:   Ordered at: 12/18/20 4527     Code Status:    CPR     Medical Interventions (Level of Support Prior to Arrest):    Full       Sanford Ng DO  [facility] Hospitalist  12/21/20  08:00 EST    Please note that this discharge summary required more than 30 minutes to complete.

## 2020-12-23 LAB
BACTERIA SPEC AEROBE CULT: NORMAL
BACTERIA SPEC AEROBE CULT: NORMAL

## 2021-09-24 NOTE — PLAN OF CARE
Goal Outcome Evaluation:  Plan of Care Reviewed With: patient  Progress: improving  Outcome Summary: Pt had stress and echo today with recommendations for life vest.  Pt denies chest pain at this time.  Orthostatic BP done.  MD aware.  Call light in reach.  Will continue to monitor.   Please return to the ER if your pain gets worse, or you develop nausea vomiting, fever chills.  It is important to follow-up with your regular doctor moving forward.

## 2022-07-22 ENCOUNTER — HOSPITAL ENCOUNTER (OUTPATIENT)
Dept: MAMMOGRAPHY | Facility: HOSPITAL | Age: 47
End: 2022-07-22

## 2022-09-12 ENCOUNTER — TRANSCRIBE ORDERS (OUTPATIENT)
Dept: ADMINISTRATIVE | Facility: HOSPITAL | Age: 47
End: 2022-09-12

## 2022-09-12 DIAGNOSIS — Z12.31 VISIT FOR SCREENING MAMMOGRAM: Primary | ICD-10-CM

## 2022-09-30 ENCOUNTER — HOSPITAL ENCOUNTER (OUTPATIENT)
Dept: MAMMOGRAPHY | Facility: HOSPITAL | Age: 47
Discharge: HOME | End: 2022-09-30
Admitting: NURSE PRACTITIONER

## 2022-09-30 DIAGNOSIS — Z12.31 VISIT FOR SCREENING MAMMOGRAM: ICD-10-CM

## 2022-09-30 PROCEDURE — 77067 SCR MAMMO BI INCL CAD: CPT | Performed by: RADIOLOGY

## 2022-09-30 PROCEDURE — 77063 BREAST TOMOSYNTHESIS BI: CPT

## 2022-09-30 PROCEDURE — 77063 BREAST TOMOSYNTHESIS BI: CPT | Performed by: RADIOLOGY

## 2022-09-30 PROCEDURE — 77067 SCR MAMMO BI INCL CAD: CPT

## 2023-05-20 ENCOUNTER — APPOINTMENT (OUTPATIENT)
Dept: NUCLEAR MEDICINE | Facility: HOSPITAL | Age: 48
End: 2023-05-20
Payer: COMMERCIAL

## 2023-05-20 ENCOUNTER — APPOINTMENT (OUTPATIENT)
Dept: CARDIOLOGY | Facility: HOSPITAL | Age: 48
End: 2023-05-20
Payer: COMMERCIAL

## 2023-05-20 ENCOUNTER — HOSPITAL ENCOUNTER (OUTPATIENT)
Facility: HOSPITAL | Age: 48
Discharge: HOME | End: 2023-05-23
Attending: INTERNAL MEDICINE | Admitting: INTERNAL MEDICINE
Payer: COMMERCIAL

## 2023-05-20 DIAGNOSIS — I25.10 CORONARY ARTERY DISEASE INVOLVING NATIVE HEART WITHOUT ANGINA PECTORIS, UNSPECIFIED VESSEL OR LESION TYPE: Primary | ICD-10-CM

## 2023-05-20 DIAGNOSIS — I25.5 ISCHEMIC CARDIOMYOPATHY: ICD-10-CM

## 2023-05-20 PROBLEM — J18.9 PNEUMONIA: Status: ACTIVE | Noted: 2023-05-20

## 2023-05-20 LAB
ANION GAP SERPL CALCULATED.3IONS-SCNC: 11.3 MMOL/L (ref 5–15)
BASOPHILS # BLD AUTO: 0.04 10*3/MM3 (ref 0–0.2)
BASOPHILS NFR BLD AUTO: 0.3 % (ref 0–1.5)
BH CV NUCLEAR PRIOR STUDY: 3
BH CV REST NUCLEAR ISOTOPE DOSE: 10.9 MCI
BH CV STRESS BP STAGE 1: NORMAL
BH CV STRESS COMMENTS STAGE 1: NORMAL
BH CV STRESS DOSE REGADENOSON STAGE 1: 0.4
BH CV STRESS DURATION MIN STAGE 1: 0
BH CV STRESS DURATION SEC STAGE 1: 10
BH CV STRESS HR STAGE 1: 112
BH CV STRESS NUCLEAR ISOTOPE DOSE: 29.6 MCI
BH CV STRESS PROTOCOL 1: NORMAL
BH CV STRESS RECOVERY BP: NORMAL MMHG
BH CV STRESS RECOVERY HR: 92 BPM
BH CV STRESS STAGE 1: 1
BUN SERPL-MCNC: 9 MG/DL (ref 6–20)
BUN/CREAT SERPL: 13.6 (ref 7–25)
CALCIUM SPEC-SCNC: 8.8 MG/DL (ref 8.6–10.5)
CHLORIDE SERPL-SCNC: 100 MMOL/L (ref 98–107)
CHOLEST SERPL-MCNC: 136 MG/DL (ref 0–200)
CO2 SERPL-SCNC: 23.7 MMOL/L (ref 22–29)
CREAT SERPL-MCNC: 0.66 MG/DL (ref 0.57–1)
D-LACTATE SERPL-SCNC: 0.8 MMOL/L (ref 0.5–2)
DEPRECATED RDW RBC AUTO: 51.7 FL (ref 37–54)
DIGOXIN SERPL-MCNC: 0.5 NG/ML (ref 0.6–1.2)
EGFRCR SERPLBLD CKD-EPI 2021: 109 ML/MIN/1.73
EOSINOPHIL # BLD AUTO: 0.08 10*3/MM3 (ref 0–0.4)
EOSINOPHIL NFR BLD AUTO: 0.6 % (ref 0.3–6.2)
ERYTHROCYTE [DISTWIDTH] IN BLOOD BY AUTOMATED COUNT: 17 % (ref 12.3–15.4)
GEN 5 2HR TROPONIN T REFLEX: 38 NG/L
GLUCOSE SERPL-MCNC: 94 MG/DL (ref 65–99)
HBA1C MFR BLD: 6.3 % (ref 4.8–5.6)
HCT VFR BLD AUTO: 35.9 % (ref 34–46.6)
HDLC SERPL-MCNC: 38 MG/DL (ref 40–60)
HGB BLD-MCNC: 11.2 G/DL (ref 12–15.9)
IMM GRANULOCYTES # BLD AUTO: 0.06 10*3/MM3 (ref 0–0.05)
IMM GRANULOCYTES NFR BLD AUTO: 0.5 % (ref 0–0.5)
LDLC SERPL CALC-MCNC: 75 MG/DL (ref 0–100)
LDLC/HDLC SERPL: 1.91 {RATIO}
LV EF NUC BP: 29 %
LYMPHOCYTES # BLD AUTO: 2.99 10*3/MM3 (ref 0.7–3.1)
LYMPHOCYTES NFR BLD AUTO: 24.3 % (ref 19.6–45.3)
M PNEUMO IGM SER QL: NEGATIVE
MAXIMAL PREDICTED HEART RATE: 173 BPM
MCH RBC QN AUTO: 26.2 PG (ref 26.6–33)
MCHC RBC AUTO-ENTMCNC: 31.2 G/DL (ref 31.5–35.7)
MCV RBC AUTO: 83.9 FL (ref 79–97)
MONOCYTES # BLD AUTO: 0.7 10*3/MM3 (ref 0.1–0.9)
MONOCYTES NFR BLD AUTO: 5.7 % (ref 5–12)
NEUTROPHILS NFR BLD AUTO: 68.6 % (ref 42.7–76)
NEUTROPHILS NFR BLD AUTO: 8.45 10*3/MM3 (ref 1.7–7)
NRBC BLD AUTO-RTO: 0 /100 WBC (ref 0–0.2)
PERCENT MAX PREDICTED HR: 64.74 %
PLATELET # BLD AUTO: 296 10*3/MM3 (ref 140–450)
PMV BLD AUTO: 9.8 FL (ref 6–12)
POTASSIUM SERPL-SCNC: 3.8 MMOL/L (ref 3.5–5.2)
PROCALCITONIN SERPL-MCNC: 0.05 NG/ML (ref 0–0.25)
RBC # BLD AUTO: 4.28 10*6/MM3 (ref 3.77–5.28)
S PNEUM AG SPEC QL LA: NEGATIVE
SODIUM SERPL-SCNC: 135 MMOL/L (ref 136–145)
STRESS BASELINE BP: NORMAL MMHG
STRESS BASELINE HR: 91 BPM
STRESS PERCENT HR: 76 %
STRESS POST PEAK BP: NORMAL MMHG
STRESS POST PEAK HR: 112 BPM
STRESS TARGET HR: 147 BPM
TRIGL SERPL-MCNC: 127 MG/DL (ref 0–150)
TROPONIN T DELTA: -1 NG/L
TROPONIN T SERPL HS-MCNC: 39 NG/L
VLDLC SERPL-MCNC: 23 MG/DL (ref 5–40)
WBC NRBC COR # BLD: 12.32 10*3/MM3 (ref 3.4–10.8)

## 2023-05-20 PROCEDURE — 80061 LIPID PANEL: CPT | Performed by: INTERNAL MEDICINE

## 2023-05-20 PROCEDURE — 84145 PROCALCITONIN (PCT): CPT | Performed by: INTERNAL MEDICINE

## 2023-05-20 PROCEDURE — 25010000002 CEFTRIAXONE PER 250 MG: Performed by: INTERNAL MEDICINE

## 2023-05-20 PROCEDURE — 80162 ASSAY OF DIGOXIN TOTAL: CPT | Performed by: INTERNAL MEDICINE

## 2023-05-20 PROCEDURE — 83036 HEMOGLOBIN GLYCOSYLATED A1C: CPT | Performed by: INTERNAL MEDICINE

## 2023-05-20 PROCEDURE — 85025 COMPLETE CBC W/AUTO DIFF WBC: CPT | Performed by: INTERNAL MEDICINE

## 2023-05-20 PROCEDURE — 93306 TTE W/DOPPLER COMPLETE: CPT | Performed by: INTERNAL MEDICINE

## 2023-05-20 PROCEDURE — 96372 THER/PROPH/DIAG INJ SC/IM: CPT

## 2023-05-20 PROCEDURE — 93306 TTE W/DOPPLER COMPLETE: CPT

## 2023-05-20 PROCEDURE — 25010000002 ENOXAPARIN PER 10 MG: Performed by: INTERNAL MEDICINE

## 2023-05-20 PROCEDURE — 63710000001 PREDNISONE PER 1 MG: Performed by: INTERNAL MEDICINE

## 2023-05-20 PROCEDURE — 63710000001 PREDNISONE PER 1 MG: Performed by: STUDENT IN AN ORGANIZED HEALTH CARE EDUCATION/TRAINING PROGRAM

## 2023-05-20 PROCEDURE — 99223 1ST HOSP IP/OBS HIGH 75: CPT | Performed by: INTERNAL MEDICINE

## 2023-05-20 PROCEDURE — 93005 ELECTROCARDIOGRAM TRACING: CPT | Performed by: INTERNAL MEDICINE

## 2023-05-20 PROCEDURE — 93018 CV STRESS TEST I&R ONLY: CPT | Performed by: INTERNAL MEDICINE

## 2023-05-20 PROCEDURE — 0 TECHNETIUM SESTAMIBI: Performed by: STUDENT IN AN ORGANIZED HEALTH CARE EDUCATION/TRAINING PROGRAM

## 2023-05-20 PROCEDURE — 78452 HT MUSCLE IMAGE SPECT MULT: CPT

## 2023-05-20 PROCEDURE — 78452 HT MUSCLE IMAGE SPECT MULT: CPT | Performed by: INTERNAL MEDICINE

## 2023-05-20 PROCEDURE — 83605 ASSAY OF LACTIC ACID: CPT | Performed by: INTERNAL MEDICINE

## 2023-05-20 PROCEDURE — 80048 BASIC METABOLIC PNL TOTAL CA: CPT | Performed by: INTERNAL MEDICINE

## 2023-05-20 PROCEDURE — 93010 ELECTROCARDIOGRAM REPORT: CPT | Performed by: INTERNAL MEDICINE

## 2023-05-20 PROCEDURE — 87040 BLOOD CULTURE FOR BACTERIA: CPT | Performed by: INTERNAL MEDICINE

## 2023-05-20 PROCEDURE — 93017 CV STRESS TEST TRACING ONLY: CPT

## 2023-05-20 PROCEDURE — A9500 TC99M SESTAMIBI: HCPCS | Performed by: STUDENT IN AN ORGANIZED HEALTH CARE EDUCATION/TRAINING PROGRAM

## 2023-05-20 PROCEDURE — 86738 MYCOPLASMA ANTIBODY: CPT | Performed by: INTERNAL MEDICINE

## 2023-05-20 PROCEDURE — 25010000002 REGADENOSON 0.4 MG/5ML SOLUTION: Performed by: STUDENT IN AN ORGANIZED HEALTH CARE EDUCATION/TRAINING PROGRAM

## 2023-05-20 PROCEDURE — 84484 ASSAY OF TROPONIN QUANT: CPT | Performed by: INTERNAL MEDICINE

## 2023-05-20 PROCEDURE — 87899 AGENT NOS ASSAY W/OPTIC: CPT | Performed by: INTERNAL MEDICINE

## 2023-05-20 RX ORDER — CETIRIZINE HYDROCHLORIDE 10 MG/1
10 TABLET ORAL DAILY
Status: DISCONTINUED | OUTPATIENT
Start: 2023-05-20 | End: 2023-05-23 | Stop reason: HOSPADM

## 2023-05-20 RX ORDER — ACETAMINOPHEN 325 MG/1
650 TABLET ORAL EVERY 6 HOURS PRN
Status: DISCONTINUED | OUTPATIENT
Start: 2023-05-20 | End: 2023-05-23 | Stop reason: HOSPADM

## 2023-05-20 RX ORDER — FLUOXETINE HYDROCHLORIDE 20 MG/1
20 CAPSULE ORAL DAILY
Status: DISCONTINUED | OUTPATIENT
Start: 2023-05-20 | End: 2023-05-23 | Stop reason: HOSPADM

## 2023-05-20 RX ORDER — DAPAGLIFLOZIN 10 MG/1
TABLET, FILM COATED ORAL
COMMUNITY

## 2023-05-20 RX ORDER — CARVEDILOL 6.25 MG/1
6.25 TABLET ORAL 2 TIMES DAILY WITH MEALS
COMMUNITY

## 2023-05-20 RX ORDER — HYDROXYZINE HYDROCHLORIDE 25 MG/1
25 TABLET, FILM COATED ORAL
Status: DISCONTINUED | OUTPATIENT
Start: 2023-05-20 | End: 2023-05-23 | Stop reason: HOSPADM

## 2023-05-20 RX ORDER — GUAIFENESIN/DEXTROMETHORPHAN 100-10MG/5
10 SYRUP ORAL EVERY 4 HOURS PRN
Status: DISCONTINUED | OUTPATIENT
Start: 2023-05-20 | End: 2023-05-23 | Stop reason: HOSPADM

## 2023-05-20 RX ORDER — CARVEDILOL 6.25 MG/1
6.25 TABLET ORAL 2 TIMES DAILY WITH MEALS
Status: DISCONTINUED | OUTPATIENT
Start: 2023-05-20 | End: 2023-05-23 | Stop reason: HOSPADM

## 2023-05-20 RX ORDER — HYDROXYZINE PAMOATE 25 MG/1
50 CAPSULE ORAL NIGHTLY
COMMUNITY

## 2023-05-20 RX ORDER — REGADENOSON 0.08 MG/ML
0.4 INJECTION, SOLUTION INTRAVENOUS
Status: COMPLETED | OUTPATIENT
Start: 2023-05-20 | End: 2023-05-20

## 2023-05-20 RX ORDER — DIGOXIN 125 MCG
125 TABLET ORAL
COMMUNITY

## 2023-05-20 RX ORDER — GUAIFENESIN 600 MG/1
1200 TABLET, EXTENDED RELEASE ORAL EVERY 12 HOURS SCHEDULED
Status: DISCONTINUED | OUTPATIENT
Start: 2023-05-20 | End: 2023-05-23 | Stop reason: HOSPADM

## 2023-05-20 RX ORDER — HYDROXYZINE 50 MG/1
50 TABLET, FILM COATED ORAL NIGHTLY
Status: DISCONTINUED | OUTPATIENT
Start: 2023-05-20 | End: 2023-05-23 | Stop reason: HOSPADM

## 2023-05-20 RX ORDER — PREDNISONE 20 MG/1
40 TABLET ORAL
Status: DISCONTINUED | OUTPATIENT
Start: 2023-05-20 | End: 2023-05-23 | Stop reason: HOSPADM

## 2023-05-20 RX ORDER — ASPIRIN 81 MG/1
81 TABLET, CHEWABLE ORAL DAILY
Status: DISCONTINUED | OUTPATIENT
Start: 2023-05-20 | End: 2023-05-23 | Stop reason: HOSPADM

## 2023-05-20 RX ORDER — ATORVASTATIN CALCIUM 40 MG/1
80 TABLET, FILM COATED ORAL DAILY
Status: DISCONTINUED | OUTPATIENT
Start: 2023-05-20 | End: 2023-05-22

## 2023-05-20 RX ORDER — LORATADINE 10 MG/1
10 TABLET ORAL DAILY
COMMUNITY

## 2023-05-20 RX ORDER — ENOXAPARIN SODIUM 100 MG/ML
40 INJECTION SUBCUTANEOUS EVERY 24 HOURS
Status: DISCONTINUED | OUTPATIENT
Start: 2023-05-20 | End: 2023-05-23 | Stop reason: HOSPADM

## 2023-05-20 RX ORDER — ACETAMINOPHEN 325 MG/1
650 TABLET ORAL EVERY 6 HOURS PRN
Status: DISCONTINUED | OUTPATIENT
Start: 2023-05-20 | End: 2023-05-20

## 2023-05-20 RX ORDER — SACUBITRIL AND VALSARTAN 49; 51 MG/1; MG/1
1 TABLET, FILM COATED ORAL 2 TIMES DAILY
COMMUNITY

## 2023-05-20 RX ORDER — DIGOXIN 125 MCG
125 TABLET ORAL
Status: DISCONTINUED | OUTPATIENT
Start: 2023-05-20 | End: 2023-05-23 | Stop reason: HOSPADM

## 2023-05-20 RX ORDER — OMEPRAZOLE 40 MG/1
40 CAPSULE, DELAYED RELEASE ORAL DAILY
COMMUNITY

## 2023-05-20 RX ORDER — BENZONATATE 100 MG/1
200 CAPSULE ORAL 3 TIMES DAILY PRN
Status: DISCONTINUED | OUTPATIENT
Start: 2023-05-20 | End: 2023-05-23 | Stop reason: HOSPADM

## 2023-05-20 RX ORDER — PANTOPRAZOLE SODIUM 40 MG/1
40 TABLET, DELAYED RELEASE ORAL
Status: DISCONTINUED | OUTPATIENT
Start: 2023-05-20 | End: 2023-05-23 | Stop reason: HOSPADM

## 2023-05-20 RX ADMIN — ACETAMINOPHEN 650 MG: 325 TABLET ORAL at 03:13

## 2023-05-20 RX ADMIN — AZTREONAM 1 G: 1 INJECTION, POWDER, LYOPHILIZED, FOR SOLUTION INTRAMUSCULAR; INTRAVENOUS at 03:13

## 2023-05-20 RX ADMIN — REGADENOSON 0.4 MG: 0.08 INJECTION, SOLUTION INTRAVENOUS at 11:45

## 2023-05-20 RX ADMIN — SACUBITRIL AND VALSARTAN 1 TABLET: 49; 51 TABLET, FILM COATED ORAL at 20:19

## 2023-05-20 RX ADMIN — DOXYCYCLINE 100 MG: 100 INJECTION, POWDER, LYOPHILIZED, FOR SOLUTION INTRAVENOUS at 03:54

## 2023-05-20 RX ADMIN — DOXYCYCLINE 100 MG: 100 INJECTION, POWDER, LYOPHILIZED, FOR SOLUTION INTRAVENOUS at 15:05

## 2023-05-20 RX ADMIN — SODIUM CHLORIDE 2 G: 9 INJECTION, SOLUTION INTRAVENOUS at 04:20

## 2023-05-20 RX ADMIN — PANTOPRAZOLE SODIUM 40 MG: 40 TABLET, DELAYED RELEASE ORAL at 06:07

## 2023-05-20 RX ADMIN — ENOXAPARIN SODIUM 40 MG: 40 INJECTION SUBCUTANEOUS at 06:43

## 2023-05-20 RX ADMIN — FLUOXETINE HYDROCHLORIDE 20 MG: 20 CAPSULE ORAL at 14:42

## 2023-05-20 RX ADMIN — GUAIFENESIN 1200 MG: 600 TABLET, EXTENDED RELEASE ORAL at 20:19

## 2023-05-20 RX ADMIN — CETIRIZINE HYDROCHLORIDE 10 MG: 10 TABLET, FILM COATED ORAL at 14:42

## 2023-05-20 RX ADMIN — TECHNETIUM TC 99M SESTAMIBI 1 DOSE: 1 INJECTION INTRAVENOUS at 09:00

## 2023-05-20 RX ADMIN — HYDROXYZINE HYDROCHLORIDE 50 MG: 50 TABLET ORAL at 20:19

## 2023-05-20 RX ADMIN — TECHNETIUM TC 99M SESTAMIBI 1 DOSE: 1 INJECTION INTRAVENOUS at 11:45

## 2023-05-20 RX ADMIN — SACUBITRIL AND VALSARTAN 1 TABLET: 49; 51 TABLET, FILM COATED ORAL at 14:42

## 2023-05-20 RX ADMIN — ATORVASTATIN CALCIUM 80 MG: 40 TABLET, FILM COATED ORAL at 14:42

## 2023-05-20 RX ADMIN — HYDROXYZINE HYDROCHLORIDE 25 MG: 25 TABLET ORAL at 14:41

## 2023-05-20 RX ADMIN — ASPIRIN 81 MG: 81 TABLET, CHEWABLE ORAL at 15:05

## 2023-05-20 RX ADMIN — GUAIFENESIN 1200 MG: 600 TABLET, EXTENDED RELEASE ORAL at 15:05

## 2023-05-20 RX ADMIN — EMPAGLIFLOZIN 25 MG: 25 TABLET, FILM COATED ORAL at 14:42

## 2023-05-20 RX ADMIN — ACETAMINOPHEN 650 MG: 325 TABLET ORAL at 20:20

## 2023-05-20 RX ADMIN — PREDNISONE 40 MG: 20 TABLET ORAL at 15:05

## 2023-05-20 NOTE — Clinical Note
Allergies reviewed.  H&P note has been confirmed for the patient. Procedural consent has been signed.  Blood consent has not been signed. Staff has reviewed the patient's labs.  Labs have been reviewed and are in within normal limits, The patient has denied she is pregnant.

## 2023-05-20 NOTE — PLAN OF CARE
Goal Outcome Evaluation:   Patient admitted overnight from outside hospital. VSS on room air, SR on the monitor. No indicators of acute distress noted. Call light within reach.

## 2023-05-20 NOTE — PROGRESS NOTES
[facility] Medicine Services  BRIEF PROGRESS NOTE    Subjective/Interval History:  Esme Francois is a 47 y.o. female patient admitted after midnight on 5/20/2023 as a transfer from [facility] for pneumonia and elevated troponin. She has a pMH significant for chronic combined HFrEF, nonobstructive coronary artery disease, chronic left bundle branch block, hypertension, KENDALL, obesity by BMI, and former tobacco abuse. For further and complete admitting details, please see admission H&P.    She was admitted to the Telemetry floor for further monitoring, evaluation, and treatment. Patient reported shortness of breath which began 2-3 days prior to arrival. She also reported productive cough with occasional blood-streaked sputum. During episodes of cough, patient states that she had some palpitations. Denied any chest pain or chest pressure. CT of the chest from outside hospital revealed patchy infiltrates in the perihilar region, left upper lobe and left lower lobe that were suspicious for pneumonia. There was no evidence of pulmonary edema or PE. White count only mildly elevated at 12K. Procal and lactate negative. Placed on IV Rocephin and Doxycycline for empiric pneumonia coverage. Respiratory culture, S. Pneumo Ag, and Mycoplasma IgM ordered. Incentive spirometry use encouraged. Troponin was 39 on arrival with -1 delta. EKG revealed NSR in the 90s with L BBB. No evidence of acute ischemia. Stress test and transthoracic echocardiogram ordered. Continuous cardiac monitoring in place. Most recent echo from 12/19/2020 reviewed. Revealed LVEF 31-35% with severely decreased left ventricular systolic function. Septal wall motion was abnormal, consistent with a bundle branch block. Also noted mild mitral valve regurg and trivial pericardial effusion. Home medications resumed for GDMT including aspirin, high-dose statin, beta blocker (Coreg), Jardiance, Entresto, and Digoxin. Digoxin level mildly  low at 0.50. Further POC pending results of updated echo and stress test.     Patient seen and evaluated this morning after admission. She was asleep upon first entering room. No s/s acute distress. Patient's only complaint at this time is a mild headache. She continues to deny any chest pain, chest pressure, backache, nausea, or vomiting. Discussed plan with patient. She voiced agreement with no further questions, needs, or concerns at this time. Discussed with attending physician, Orestes Geiger DO.     Vitals:  Temp:  [97.9 °F (36.6 °C)-99.1 °F (37.3 °C)] 97.9 °F (36.6 °C)  Heart Rate:  [] 86  Resp:  [18-20] 20  BP: (103-125)/(57-69) 103/57  Mean Arterial Pressure (Non-Invasive) for the past 24 hrs (Last 3 readings):   Noninvasive MAP (mmHg)   05/20/23 0713 73   05/20/23 0312 74   05/20/23 0130 87     SpO2 Percentage    05/20/23 0130 05/20/23 0312 05/20/23 0713   SpO2: 98% 98% 98%     SpO2:  [98 %] 98 %  on   ;   Device (Oxygen Therapy): room air    Body mass index is 43.52 kg/m².  Wt Readings from Last 3 Encounters:   05/20/23 119 kg (261 lb 8 oz)   12/19/20 113 kg (250 lb)   05/26/17 101 kg (223 lb 9.6 oz)             LEONIE Luna  Hospitalist Service -- [facility]   Pager: [phone]    05/20/23  12:42 EDT    Attending Physician: Orestes Huertas*

## 2023-05-20 NOTE — Clinical Note
Hemostasis started on the right radial artery. R-Band was used in achieving hemostasis. Radial compression device applied to vessel. Hemostasis achieved successfully. Closure device additional comment: 15mL air

## 2023-05-20 NOTE — PAYOR COMM NOTE
"[facility]  JIM UNDERWOOD  PHONE  [phone]  FAX  [phone]  NPI:  [id number]    REQUEST FOR INPATIENT AUTH    Esme Hernandez (47 y.o. Female)     Date of Birth   1975    Social Security Number       Address   [address] [facility] [address]    Home Phone   [phone]    MRN   [id number]       Mu-ism   [facility]    Marital Status                               Admission Date   23    Admission Type   Urgent    Admitting Provider   Rose Marie Le DO    Attending Provider   Orestes Huertas DO    Department, Room/Bed   [address], 3311/2S       Discharge Date       Discharge Disposition       Discharge Destination                               Attending Provider: Orestes Huertas DO    Allergies: Keflex [Cephalexin], Tricor [Fenofibrate], Metoprolol, Adhesive Tape, Latex, Tylenol With Codeine #3 [Acetaminophen-codeine]    Isolation: None   Infection: None   Code Status: CPR    Ht: 165.1 cm (65\")   Wt: 119 kg (261 lb 8 oz)    Admission Cmt: None   Principal Problem: Pneumonia [J18.9]                 Active Insurance as of 2023     Primary Coverage     Payor Plan Insurance Group Employer/Plan Group    [facility] BY JANA [facility] BY JANA [id number]     Payor Plan Address Payor Plan Phone Number Payor Plan Fax Number Effective Dates    [address] [address]   2021 - None Entered    [facility] [id number]       Subscriber Name Subscriber Birth Date Member ID       ESME HERNANDEZ 1975 [id number]                 Emergency Contacts      (Rel.) Home Phone Work Phone Mobile Phone    Venessa Magallanes (Mother) -- -- [phone]               History & Physical      Rose aMrie Le DO at 23 0237          [facility]   HISTORY AND PHYSICAL    Patient Name: Esme Hernandez  : 1975  MRN: [id number]  Primary Care Physician:  Bobby Dooley DO  Date of admission: 2023    Subjective "   Subjective     Chief Complaint: Progressive cough    History of Present Illness the patient is a 47-year-old female with past medical history significant for combined CHF, nonobstructive coronary artery disease, history of previous LBBB, hypertension, KENDALL and previous tobacco use who presents in transfer from [facility] for progressive cough, shortness of air and hemoptysis.    The patient was seen and examined in 3 11-2.  Patient is currently saturating in the upper 90s on room air.    The patient states that on Monday and Tuesday, her and her mother were cleaning out an older/condemned home and on or about Wednesday she started experiencing shortness of breath that has worsened since that time.  She states that she has had cough that is productive with occasional blood-streaked sputum and one small episode of hemoptysis upon arrival to the outside hospital.  The patient is a former smoker stating that she quit approximately 3 years ago.  The patient states that she did have palpitations during an episode of severe cough but denies any chest pressure or radiating chest pains at this time.    The patient denies nausea and/or vomiting.  She denies abdominal pain.  No diarrhea.  She denies any dysuria or hematuria.  No urinary frequency.    Patient states that she is compliant with her medications and states that overall, her dyspnea on exertion and other CHF type symptoms have greatly improved.    CT chest with contrast from the outside hospital revealed patchy infiltrates in the perihilar region, left upper lobe and left lower lobe that were suspicious for pneumonia.  Patient had no significant mediastinal lymphadenopathy.  The patient had no evidence of pulmonary edema or pulmonary embolism.    Review of Systems   Constitutional: Positive for fatigue. Negative for chills and fever.   HENT: Negative for congestion, hearing loss and trouble swallowing.    Eyes: Negative for visual disturbance.   Respiratory:  Positive for cough and shortness of breath.    Cardiovascular: Positive for palpitations. Negative for chest pain and leg swelling.   Gastrointestinal: Negative for abdominal pain, nausea and vomiting.   Genitourinary: Negative for dysuria and frequency.   Musculoskeletal: Negative for back pain and joint swelling.   Skin: Negative for rash and wound.   Neurological: Positive for weakness and headaches. Negative for syncope.   Psychiatric/Behavioral: Negative for confusion and hallucinations.      Personal History     Past Medical History:   Diagnosis Date   • CAD (coronary artery disease) 12/19/2020    Non obstructive   • Depression    • GERD (gastroesophageal reflux disease)    • Hyperlipidemia    • Hypertension    • Ischemic cardiomyopathy 12/19/2020   • LBBB (left bundle branch block)    • KENDALL (obstructive sleep apnea)     CPAP   • Seizures     last sz was one month ago       Past Surgical History:   Procedure Laterality Date   • APPENDECTOMY  2014   • CARDIAC CATHETERIZATION     • CHOLECYSTECTOMY  1996   • TUBAL ABDOMINAL LIGATION  2003       Family History: family history includes Aneurysm in her maternal grandmother; Asthma in her brother; Breast cancer (age of onset: 53) in her mother; Cancer in her maternal aunt and maternal uncle; Depression in her brother; Diabetes in her mother; Heart attack in her father; Hypertension in her father and mother; No Known Problems in her maternal grandfather, paternal grandfather, and paternal grandmother; Scoliosis in her brother. Otherwise pertinent FHx was reviewed and not pertinent to current issue.    Social History:  reports that she has quit smoking. Her smoking use included cigarettes. She has a 18.00 pack-year smoking history. She has never used smokeless tobacco. She reports current alcohol use. She reports that she does not currently use drugs.    Home Medications:  Diclofenac Sodium, FLUoxetine, albuterol sulfate HFA, aspirin, atorvastatin, carvedilol,  hydrOXYzine pamoate, and lisinopril    Allergies:  Allergies   Allergen Reactions   • Keflex [Cephalexin]    • Tricor [Fenofibrate]    • Metoprolol Unknown - Low Severity     Pt states her doctor took her off her medications   • Tylenol With Codeine #3 [Acetaminophen-Codeine] Itching   • Adhesive Tape Rash   • Latex Rash       Objective    Objective     Vitals:   Temp:  [99.1 °F (37.3 °C)] 99.1 °F (37.3 °C)  Heart Rate:  [96] 96  Resp:  [18] 18  BP: (125)/(69) 125/69    Physical Exam  Constitutional:       General: She is not in acute distress.     Appearance: She is well-developed. She is morbidly obese.   HENT:      Head: Normocephalic and atraumatic.   Eyes:      Conjunctiva/sclera: Conjunctivae normal.   Neck:      Trachea: No tracheal deviation.   Cardiovascular:      Rate and Rhythm: Normal rate and regular rhythm.      Pulses:           Dorsalis pedis pulses are 2+ on the right side and 2+ on the left side.      Heart sounds: No murmur heard.    No friction rub. No gallop.   Pulmonary:      Effort: No respiratory distress.      Breath sounds: Examination of the left-upper field reveals rales. Examination of the left-lower field reveals rales. Rales present. No wheezing.   Abdominal:      General: Bowel sounds are normal. There is no distension.      Palpations: Abdomen is soft.      Tenderness: There is no abdominal tenderness. There is no guarding.   Musculoskeletal:      Right lower leg: No edema.      Left lower leg: No edema.   Skin:     General: Skin is warm and dry.      Findings: No erythema or rash.   Neurological:      Mental Status: She is alert and oriented to person, place, and time.      Cranial Nerves: No cranial nerve deficit.         Result Review    Result Review:  I have personally reviewed the results from the time of this admission to 5/20/2023 02:37 EDT and agree with these findings:  [x]  Laboratory list / accordion  []  Microbiology  [x]  Radiology  []  EKG/Telemetry   []   Cardiology/Vascular   []  Pathology  []  Old records  []  Other:  Most notable findings include: Troponin T here minimally elevated at 39 with a repeat of 38.  BMP is largely unremarkable, BUN/creatinine 9/0.66 respectively.  HgbA1c is well controlled at 6.30%.    Lactate within normal limits at 0.8, procalcitonin 0.05.    CBC with WBC 12.32, hemoglobin 11.2 and platelets within normal limits at 296.    Blood cultures are ordered and pending.    Echocardiogram 12/19/2020:  Interpretation Summary    • Left ventricular ejection fraction appears to be 31 - 35%. Left ventricular systolic function is severely decreased. Septal wall motion is abnormal, consistent with a bundle branch block. The left ventricular cavity is mild to moderately dilated. Left ventricular diastolic function is consistent with (grade II w/high LAP) pseudonormalization.  • Mild mitral valve regurgitation is present  • There is a trivial pericardial effusion    Assessment / Plan     Esme Francois is a 47 y.o. female who presents in transfer for shortness of air and hemoptysis found to be due to a left-sided community-acquired pneumonia; patient also with mild troponin T elevation in the setting of a past medical history of nonobstructive coronary artery disease and combined CHF.    #Left upper lobe and left lower lobe community-acquired pneumonia, present on admission  #Sepsis criteria is met with tachycardia and mild leukocytosis, present on admission and secondary to above  #Mild troponin T elevation, thought to be secondary to above  #Chronic combined heart failure  #Nonobstructive coronary artery disease  #History of previous left bundle branch block  #NIDDM type II with HgbA1c 6.3%  #Morbid obesity with BMI 43.52 kg/m²  #Reported history of KENDALL, compliant with CPAP  #Former smoker  #Chronic normocytic anemia  #Anxiety/depression  #GERD  #History of reported seizures  -Patient has been admitted to the telemetry unit  -I have requested a  respiratory culture and testing for Streptococcus and mycoplasma  -Patient will be started on antibiotic therapy with Rocephin and doxycycline  -If patient does not show clinical improvement, may consider addition of an antifungal medication given that patient's symptoms started shortly after she was cleaning a an older condemned home  -I have requested incentive spirometer  -Supportive care for cough with as needed Tessalon Perles or Robitussin  -I will update the patient's echocardiogram  -I have resumed the patient's home aspirin, atorvastatin, beta-blocker, Jardiance and Entresto  -Patient on digoxin (?); will obtain a level and continue for now  -Baseline EKG ordered and pending  -Resume the patient's home fluoxetine for now pending EKG evaluation  -Resume home hydroxyzine for anxiety  -Resume PPI  -Patient has not on antiepileptic medications; will continue to monitor for now    Repeat chemistry panel and CBC in a.m.; monitor temperature curve. Further management pending clinical course and culture data results.     DVT prophylaxis:  Lovenox    CODE STATUS:    FULL    Admission Status:  I believe this patient meets inpatient status.    Rose Marie Le DO    Electronically signed by Rose Marie Le DO at 05/20/23 0540       Emergency Department Notes    No notes of this type exist for this encounter.           Current Facility-Administered Medications   Medication Dose Route Frequency Provider Last Rate Last Admin   • acetaminophen (TYLENOL) tablet 650 mg  650 mg Oral Q6H PRN Renetat Garnica PA-C   650 mg at 05/20/23 0313   • aspirin chewable tablet 81 mg  81 mg Oral Daily Rose Marie Le DO       • atorvastatin (LIPITOR) tablet 80 mg  80 mg Oral Daily Rose Marie Le DO       • benzonatate (TESSALON) capsule 200 mg  200 mg Oral TID PRN Rose Marie Le DO       • carvedilol (COREG) tablet 6.25 mg  6.25 mg Oral BID With Meals Rose Marie Le DO       • cefTRIAXone  (ROCEPHIN) 2 g in sodium chloride 0.9 % 100 mL IVPB-VTB  2 g Intravenous Q24H Rose Marie Le  mL/hr at 05/20/23 0420 2 g at 05/20/23 0420   • cetirizine (zyrTEC) tablet 10 mg  10 mg Oral Daily Rose Marie Le DO       • digoxin (LANOXIN) tablet 125 mcg  125 mcg Oral Daily Rose Marie Le DO       • doxycycline (VIBRAMYCIN) 100 mg in sodium chloride 0.9 % 100 mL IVPB-VTB  100 mg Intravenous Q12H Rose Marie Le DO   100 mg at 05/20/23 0354   • empagliflozin (JARDIANCE) tablet 25 mg  25 mg Oral Daily Rose Marie Le DO       • Enoxaparin Sodium (LOVENOX) syringe 40 mg  40 mg Subcutaneous Q24H Rose Marie Le DO   40 mg at 05/20/23 0643   • FLUoxetine (PROzac) capsule 20 mg  20 mg Oral Daily Rose Marie Le DO       • guaiFENesin (MUCINEX) 12 hr tablet 1,200 mg  1,200 mg Oral Q12H Orestes Huertas, DO       • guaiFENesin-dextromethorphan (ROBITUSSIN DM) 100-10 MG/5ML syrup 10 mL  10 mL Oral Q4H PRN Rose Marie Le DO       • hydrOXYzine (ATARAX) tablet 25 mg  25 mg Oral 2 times per day Rose Marie Le DO       • hydrOXYzine (ATARAX) tablet 50 mg  50 mg Oral Nightly Rose Marie Le DO       • pantoprazole (PROTONIX) EC tablet 40 mg  40 mg Oral Q AM Rose Marie Le DO   40 mg at 05/20/23 0607   • predniSONE (DELTASONE) tablet 40 mg  40 mg Oral Daily With Breakfast Orestes Huertas, DO       • sacubitril-valsartan (ENTRESTO) 49-51 MG tablet 1 tablet  1 tablet Oral BID Rose Marie Le DO           Lab Results (last 24 hours)     Procedure Component Value Units Date/Time    Lipid Panel [[id number]]  (Abnormal) Collected: 05/20/23 0912    Specimen: Blood Updated: 05/20/23 0957     Total Cholesterol 136 mg/dL      Triglycerides 127 mg/dL      HDL Cholesterol 38 mg/dL      LDL Cholesterol  75 mg/dL      VLDL Cholesterol 23 mg/dL      LDL/HDL Ratio 1.91    Narrative:      Cholesterol Reference Ranges  (U.S. Department of Health and  Human Services ATP III Classifications)    Desirable          <200 mg/dL  Borderline High    200-239 mg/dL  High Risk          >240 mg/dL      Triglyceride Reference Ranges  (U.S. Department of Health and Human Services ATP III Classifications)    Normal           <150 mg/dL  Borderline High  150-199 mg/dL  High             200-499 mg/dL  Very High        >500 mg/dL    HDL Reference Ranges  (U.S. Department of Health and Human Services ATP III Classifications)    Low     <40 mg/dl (major risk factor for CHD)  High    >60 mg/dl ('negative' risk factor for CHD)        LDL Reference Ranges  (U.S. Department of Health and Human Services ATP III Classifications)    Optimal          <100 mg/dL  Near Optimal     100-129 mg/dL  Borderline High  130-159 mg/dL  High             160-189 mg/dL  Very High        >189 mg/dL    Digoxin Level [[id number]]  (Abnormal) Collected: 05/20/23 0912    Specimen: Blood Updated: 05/20/23 0957     Digoxin 0.50 ng/mL     Mycoplasma Pneumoniae Antibody, IgM - Blood, [[id number]]  (Normal) Collected: 05/20/23 0912    Specimen: Blood Updated: 05/20/23 0956     Mycoplasma pneumo IgM Negative    S. Pneumo Ag Urine or CSF - Urine, Urine, Clean Catch [[id number]] Collected: 05/20/23 0527    Specimen: Urine, Clean Catch Updated: 05/20/23 0529    High Sensitivity Troponin T 2Hr [[id number]]  (Abnormal) Collected: 05/20/23 0421    Specimen: Blood Updated: 05/20/23 0500     HS Troponin T 38 ng/L      Troponin T Delta -1 ng/L     Narrative:      High Sensitive Troponin T Reference Range:  <10.0 ng/L- Negative Female for AMI  <15.0 ng/L- Negative Male for AMI  >=10 - Abnormal Female indicating possible myocardial injury.  >=15 - Abnormal Male indicating possible myocardial injury.   Clinicians would have to utilize clinical acumen, EKG, Troponin, and serial changes to determine if it is an Acute Myocardial Infarction or myocardial injury due to an underlying chronic condition.         High Sensitivity Troponin  "T [[id number]]  (Abnormal) Collected: 05/20/23 0218    Specimen: Blood Updated: 05/20/23 0318     HS Troponin T 39 ng/L     Narrative:      High Sensitive Troponin T Reference Range:  <10.0 ng/L- Negative Female for AMI  <15.0 ng/L- Negative Male for AMI  >=10 - Abnormal Female indicating possible myocardial injury.  >=15 - Abnormal Male indicating possible myocardial injury.   Clinicians would have to utilize clinical acumen, EKG, Troponin, and serial changes to determine if it is an Acute Myocardial Infarction or myocardial injury due to an underlying chronic condition.         Procalcitonin [[id number]]  (Normal) Collected: 05/20/23 0219    Specimen: Blood Updated: 05/20/23 0257     Procalcitonin 0.05 ng/mL     Narrative:      As a Marker for Sepsis (Non-Neonates):    1. <0.5 ng/mL represents a low risk of severe sepsis and/or septic shock.  2. >2 ng/mL represents a high risk of severe sepsis and/or septic shock.    As a Marker for Lower Respiratory Tract Infections that require antibiotic therapy:    PCT on Admission    Antibiotic Therapy       6-12 Hrs later    >0.5                Strongly Recommended  >0.25 - <0.5        Recommended   0.1 - 0.25          Discouraged              Remeasure/reassess PCT  <0.1                Strongly Discouraged     Remeasure/reassess PCT    As 28 day mortality risk marker: \"Change in Procalcitonin Result\" (>80% or <=80%) if Day 0 (or Day 1) and Day 4 values are available. Refer to http://www.[facility]-pct-calculator.com    Change in PCT <=80%  A decrease of PCT levels below or equal to 80% defines a positive change in PCT test result representing a higher risk for 28-day all-cause mortality of patients diagnosed with severe sepsis for septic shock.    Change in PCT >80%  A decrease of PCT levels of more than 80% defines a negative change in PCT result representing a lower risk for 28-day all-cause mortality of patients diagnosed with severe sepsis or septic shock.       Hemoglobin " A1c [[id number]]  (Abnormal) Collected: 05/20/23 0218    Specimen: Blood Updated: 05/20/23 0253     Hemoglobin A1C 6.30 %     Narrative:      Hemoglobin A1C Ranges:    Increased Risk for Diabetes  5.7% to 6.4%  Diabetes                     >= 6.5%  Diabetic Goal                < 7.0%    Basic Metabolic Panel [[id number]]  (Abnormal) Collected: 05/20/23 0218    Specimen: Blood Updated: 05/20/23 0248     Glucose 94 mg/dL      BUN 9 mg/dL      Creatinine 0.66 mg/dL      Sodium 135 mmol/L      Potassium 3.8 mmol/L      Chloride 100 mmol/L      CO2 23.7 mmol/L      Calcium 8.8 mg/dL      BUN/Creatinine Ratio 13.6     Anion Gap 11.3 mmol/L      eGFR 109.0 mL/min/1.73     Narrative:      GFR Normal >60  Chronic Kidney Disease <60  Kidney Failure <15      Lactic Acid, Plasma [[id number]]  (Normal) Collected: 05/20/23 0218    Specimen: Blood Updated: 05/20/23 0246     Lactate 0.8 mmol/L     CBC & Differential [[id number]]  (Abnormal) Collected: 05/20/23 0218    Specimen: Blood Updated: 05/20/23 0233    Narrative:      The following orders were created for panel order CBC & Differential.  Procedure                               Abnormality         Status                     ---------                               -----------         ------                     CBC Auto Differential[[id number]]        Abnormal            Final result                 Please view results for these tests on the individual orders.    CBC Auto Differential [[id number]]  (Abnormal) Collected: 05/20/23 0218    Specimen: Blood Updated: 05/20/23 0233     WBC 12.32 10*3/mm3      RBC 4.28 10*6/mm3      Hemoglobin 11.2 g/dL      Hematocrit 35.9 %      MCV 83.9 fL      MCH 26.2 pg      MCHC 31.2 g/dL      RDW 17.0 %      RDW-SD 51.7 fl      MPV 9.8 fL      Platelets 296 10*3/mm3      Neutrophil % 68.6 %      Lymphocyte % 24.3 %      Monocyte % 5.7 %      Eosinophil % 0.6 %      Basophil % 0.3 %      Immature Grans % 0.5 %      Neutrophils, Absolute 8.45 10*3/mm3       Lymphocytes, Absolute 2.99 10*3/mm3      Monocytes, Absolute 0.70 10*3/mm3      Eosinophils, Absolute 0.08 10*3/mm3      Basophils, Absolute 0.04 10*3/mm3      Immature Grans, Absolute 0.06 10*3/mm3      nRBC 0.0 /100 WBC     Blood Culture - Blood, Hand, Right [[id number]] Collected: 05/20/23 0219    Specimen: Blood from Hand, Right Updated: 05/20/23 0224    Blood Culture - Blood, Arm, Left [[id number]] Collected: 05/20/23 0202    Specimen: Blood from Arm, Left Updated: 05/20/23 0223        Imaging Results (Last 24 Hours)     ** No results found for the last 24 hours. **        ECG/EMG Results (last 24 hours)     Procedure Component Value Units Date/Time    ECG 12 Lead Dyspnea [[id number]] Collected: 05/20/23 0812     Updated: 05/20/23 0813     QT Interval 448 ms      QTC Interval 557 ms     Narrative:      Test Reason : Dyspnea  Blood Pressure :   */*   mmHG  Vent. Rate :  93 BPM     Atrial Rate :  93 BPM     P-R Int : 166 ms          QRS Dur : 176 ms      QT Int : 448 ms       P-R-T Axes :  70  -7  90 degrees     QTc Int : 557 ms    Normal sinus rhythm  Left bundle branch block  Abnormal ECG  When compared with ECG of 18-DEC-2020 23:04,  Nonspecific T wave abnormality no longer evident in Inferior leads    Referred By: JOSE           Confirmed By:           Orders (last 24 hrs)      Start     Ordered    05/21/23 0600  CBC & Differential  Daily       05/20/23 0805    05/21/23 0600  Basic Metabolic Panel  Daily       05/20/23 0805    05/21/23 0600  Magnesium  Morning Draw         05/20/23 0805    05/20/23 2100  hydrOXYzine (ATARAX) tablet 50 mg  Nightly         05/20/23 0521 05/20/23 1200  digoxin (LANOXIN) tablet 125 mcg  Daily Digoxin         05/20/23 0521 05/20/23 0900  FLUoxetine (PROzac) capsule 20 mg  Daily         05/20/23 0521 05/20/23 0900  aspirin chewable tablet 81 mg  Daily         05/20/23 0521 05/20/23 0900  atorvastatin (LIPITOR) tablet 80 mg  Daily         05/20/23 0521 05/20/23 0900   sacubitril-valsartan (ENTRESTO) 49-51 MG tablet 1 tablet  2 Times Daily         05/20/23 0521    05/20/23 0900  cetirizine (zyrTEC) tablet 10 mg  Daily         05/20/23 0521    05/20/23 0900  empagliflozin (JARDIANCE) tablet 25 mg  Daily         05/20/23 0521    05/20/23 0900  hydrOXYzine (ATARAX) tablet 25 mg  2 times per day         05/20/23 0521    05/20/23 0900  predniSONE (DELTASONE) tablet 40 mg  Daily With Breakfast         05/20/23 0807    05/20/23 0900  guaiFENesin (MUCINEX) 12 hr tablet 1,200 mg  Every 12 Hours Scheduled         05/20/23 0807    05/20/23 0900  technetium sestamibi (CARDIOLITE) injection 1 dose  Once in Imaging         05/20/23 0909    05/20/23 0813  NPO Diet NPO Type: Strict NPO  Diet Effective Now         05/20/23 0812    05/20/23 0812  Stress Test With Myocardial Perfusion One Day  Once         05/20/23 0812    05/20/23 0800  Lipid Panel  Timed         05/20/23 0236    05/20/23 0800  carvedilol (COREG) tablet 6.25 mg  2 Times Daily With Meals         05/20/23 0521 05/20/23 0800  Digoxin Level  Timed         05/20/23 0522    05/20/23 0800  Mycoplasma Pneumoniae Antibody, IgM - Blood,  Once         05/20/23 0522    05/20/23 0800  ECG 12 Lead Dyspnea  Once         05/20/23 0532    05/20/23 0700  Enoxaparin Sodium (LOVENOX) syringe 40 mg  Every 24 Hours         05/20/23 0532    05/20/23 0615  pantoprazole (PROTONIX) EC tablet 40 mg  Every Early Morning         05/20/23 0521    05/20/23 0600  Incentive Spirometry  Every 4 Hours While Awake       05/20/23 0142    05/20/23 0600  CBC & Differential  Morning Draw         05/20/23 0142    05/20/23 0600  Basic Metabolic Panel  Morning Draw         05/20/23 0142    05/20/23 0600  CBC Auto Differential  PROCEDURE ONCE         05/20/23 0142    05/20/23 0540  guaiFENesin-dextromethorphan (ROBITUSSIN DM) 100-10 MG/5ML syrup 10 mL  Every 4 Hours PRN         05/20/23 0540    05/20/23 3018  Code Status and Medical Interventions:  Continuous          05/20/23 0532    05/20/23 0533  Diet: Cardiac Diets; Healthy Heart (2-3 Na+); Texture: Regular Texture (IDDSI 7); Fluid Consistency: Thin (IDDSI 0)  Diet Effective Now,   Status:  Canceled         05/20/23 0532    05/20/23 0523  S. Pneumo Ag Urine or CSF - Urine, Urine, Clean Catch  Once         05/20/23 0522 05/20/23 0522  benzonatate (TESSALON) capsule 200 mg  3 Times Daily PRN         05/20/23 0522    05/20/23 0418  High Sensitivity Troponin T 2Hr  PROCEDURE ONCE         05/20/23 0318    05/20/23 0415  cefTRIAXone (ROCEPHIN) 2 g in sodium chloride 0.9 % 100 mL IVPB-VTB  Every 24 Hours         05/20/23 0317    05/20/23 0314  acetaminophen (TYLENOL) tablet 650 mg  Every 6 Hours PRN,   Status:  Discontinued         05/20/23 0315    05/20/23 0303  acetaminophen (TYLENOL) tablet 650 mg  Every 6 Hours PRN         05/20/23 0303    05/20/23 0300  aztreonam (AZACTAM) 1 g in sodium chloride 0.9 % 100 mL IVPB-VTB  Every 8 Hours,   Status:  Discontinued         05/20/23 0142    05/20/23 0237  High Sensitivity Troponin T  Once         05/20/23 0236    05/20/23 0237  Hemoglobin A1c  Once         05/20/23 0236    05/20/23 0230  doxycycline (VIBRAMYCIN) 100 mg in sodium chloride 0.9 % 100 mL IVPB-VTB  Every 12 Hours         05/20/23 0142    05/20/23 0143  Adult Transthoracic Echo Complete W/ Cont if Necessary Per Protocol  Once         05/20/23 0142    05/20/23 0143  Cardiac Monitoring  Continuous        Comments: Follow Standing Orders As Outlined in Process Instructions (Open Order Report to View Full Instructions)    05/20/23 0142    05/20/23 0142  Inpatient Admission  Once         05/20/23 0142    05/20/23 0141  Pneumonia Finding Seen on CXR  Once         05/20/23 0142    05/20/23 0141  Procalcitonin  STAT         05/20/23 0142    05/20/23 0141  Lactic Acid, Plasma  STAT        Comments: Automatic Reflex Lactate Will Occur 3 Hours From Result Time With an Initial Lactate Greater Than 2      05/20/23 0142    05/20/23 0141   Respiratory Culture - Sputum, Cough  Once         05/20/23 0142    05/20/23 0140  Pulse Oximetry on Admit  Once         05/20/23 0142    05/20/23 0140  Tobacco Cessation Education  Once         05/20/23 0142    05/20/23 0140  Pneumonia Education  Once        Comments: Pneumonia Education    05/20/23 0142    05/20/23 0140  Notify Provider if Patient Requires Greater Than 4LPM of Oxygen  Until Discontinued         05/20/23 0142 05/20/23 0140  Nursing Dysphagia Screening (Complete Prior to Giving Anything By Mouth)  Once        Comments: Use Dysphagia Screen for Pneumonia    05/20/23 0142    05/20/23 0140  RN to Place Order SLP Consult - Eval & Treat Choosing Reason of RN Dysphagia Screen Failed  Per Order Details        Comments: RN to Place Order SLP Consult - Eval & Treat Choosing Reason of RN Dysphagia Screen Failed    05/20/23 0142 05/20/23 0140  Nurse to Call MD or Nutrition Services for Diet if Patient Passes Dysphagia Screen  Once         05/20/23 0142 05/20/23 0140  Blood Culture - Blood, Arm, Left  Once         05/20/23 0142 05/20/23 0140  Blood Culture - Blood, Hand, Right  Once        Comments: Minimum 30 Minutes After 1st Blood Culture or From Different Site      05/20/23 0142    Unscheduled  Blood Gas, Arterial -With Co-Ox Panel: Yes  As Needed      Comments: Respiratory Distress      05/20/23 0142    Unscheduled  Oxygen Therapy- Nasal Cannula; Titrate 1-6 LPM Per SpO2; 90 - 95%  Continuous PRN       05/20/23 0142    --  hydrOXYzine pamoate (VISTARIL) 25 MG capsule  Nightly         05/20/23 0345    --  sacubitril-valsartan (Entresto) 49-51 MG tablet  2 Times Daily         05/20/23 0345    --  omeprazole (priLOSEC) 40 MG capsule  Daily         05/20/23 0345    --  digoxin (LANOXIN) 125 MCG tablet  Daily Digoxin         05/20/23 0345    --  dapagliflozin Propanediol (Farxiga) 10 MG tablet         05/20/23 0345    --  carvedilol (COREG) 6.25 MG tablet  2 Times Daily With Meals         05/20/23  0345    --  loratadine (CLARITIN) 10 MG tablet  Daily         05/20/23 0346                Physician Progress Notes (last 24 hours)  Notes from 05/19/23 1000 through 05/20/23 1000   No notes of this type exist for this encounter.

## 2023-05-20 NOTE — H&P
[facility]   HISTORY AND PHYSICAL    Patient Name: Esme Francois  : 1975  MRN: [id number]  Primary Care Physician:  Bobby Dooley DO  Date of admission: 2023    Subjective   Subjective     Chief Complaint: Progressive cough    History of Present Illness the patient is a 47-year-old female with past medical history significant for combined CHF, nonobstructive coronary artery disease, history of previous LBBB, hypertension, KENDALL and previous tobacco use who presents in transfer from [facility] for progressive cough, shortness of air and hemoptysis.    The patient was seen and examined in 3 11-2.  Patient is currently saturating in the upper 90s on room air.    The patient states that on Monday and Tuesday, her and her mother were cleaning out an older/condemned home and on or about Wednesday she started experiencing shortness of breath that has worsened since that time.  She states that she has had cough that is productive with occasional blood-streaked sputum and one small episode of hemoptysis upon arrival to the outside hospital.  The patient is a former smoker stating that she quit approximately 3 years ago.  The patient states that she did have palpitations during an episode of severe cough but denies any chest pressure or radiating chest pains at this time.    The patient denies nausea and/or vomiting.  She denies abdominal pain.  No diarrhea.  She denies any dysuria or hematuria.  No urinary frequency.    Patient states that she is compliant with her medications and states that overall, her dyspnea on exertion and other CHF type symptoms have greatly improved.    CT chest with contrast from the outside hospital revealed patchy infiltrates in the perihilar region, left upper lobe and left lower lobe that were suspicious for pneumonia.  Patient had no significant mediastinal lymphadenopathy.  The patient had no evidence of pulmonary edema or pulmonary embolism.    Review of Systems    Constitutional: Positive for fatigue. Negative for chills and fever.   HENT: Negative for congestion, hearing loss and trouble swallowing.    Eyes: Negative for visual disturbance.   Respiratory: Positive for cough and shortness of breath.    Cardiovascular: Positive for palpitations. Negative for chest pain and leg swelling.   Gastrointestinal: Negative for abdominal pain, nausea and vomiting.   Genitourinary: Negative for dysuria and frequency.   Musculoskeletal: Negative for back pain and joint swelling.   Skin: Negative for rash and wound.   Neurological: Positive for weakness and headaches. Negative for syncope.   Psychiatric/Behavioral: Negative for confusion and hallucinations.      Personal History     Past Medical History:   Diagnosis Date   • CAD (coronary artery disease) 12/19/2020    Non obstructive   • Depression    • GERD (gastroesophageal reflux disease)    • Hyperlipidemia    • Hypertension    • Ischemic cardiomyopathy 12/19/2020   • LBBB (left bundle branch block)    • KENDALL (obstructive sleep apnea)     CPAP   • Seizures     last sz was one month ago       Past Surgical History:   Procedure Laterality Date   • APPENDECTOMY  2014   • CARDIAC CATHETERIZATION     • CHOLECYSTECTOMY  1996   • TUBAL ABDOMINAL LIGATION  2003       Family History: family history includes Aneurysm in her maternal grandmother; Asthma in her brother; Breast cancer (age of onset: 53) in her mother; Cancer in her maternal aunt and maternal uncle; Depression in her brother; Diabetes in her mother; Heart attack in her father; Hypertension in her father and mother; No Known Problems in her maternal grandfather, paternal grandfather, and paternal grandmother; Scoliosis in her brother. Otherwise pertinent FHx was reviewed and not pertinent to current issue.    Social History:  reports that she has quit smoking. Her smoking use included cigarettes. She has a 18.00 pack-year smoking history. She has never used smokeless tobacco. She  reports current alcohol use. She reports that she does not currently use drugs.    Home Medications:  Diclofenac Sodium, FLUoxetine, albuterol sulfate HFA, aspirin, atorvastatin, carvedilol, hydrOXYzine pamoate, and lisinopril    Allergies:  Allergies   Allergen Reactions   • Keflex [Cephalexin]    • Tricor [Fenofibrate]    • Metoprolol Unknown - Low Severity     Pt states her doctor took her off her medications   • Tylenol With Codeine #3 [Acetaminophen-Codeine] Itching   • Adhesive Tape Rash   • Latex Rash       Objective    Objective     Vitals:   Temp:  [99.1 °F (37.3 °C)] 99.1 °F (37.3 °C)  Heart Rate:  [96] 96  Resp:  [18] 18  BP: (125)/(69) 125/69    Physical Exam  Constitutional:       General: She is not in acute distress.     Appearance: She is well-developed. She is morbidly obese.   HENT:      Head: Normocephalic and atraumatic.   Eyes:      Conjunctiva/sclera: Conjunctivae normal.   Neck:      Trachea: No tracheal deviation.   Cardiovascular:      Rate and Rhythm: Normal rate and regular rhythm.      Pulses:           Dorsalis pedis pulses are 2+ on the right side and 2+ on the left side.      Heart sounds: No murmur heard.    No friction rub. No gallop.   Pulmonary:      Effort: No respiratory distress.      Breath sounds: Examination of the left-upper field reveals rales. Examination of the left-lower field reveals rales. Rales present. No wheezing.   Abdominal:      General: Bowel sounds are normal. There is no distension.      Palpations: Abdomen is soft.      Tenderness: There is no abdominal tenderness. There is no guarding.   Musculoskeletal:      Right lower leg: No edema.      Left lower leg: No edema.   Skin:     General: Skin is warm and dry.      Findings: No erythema or rash.   Neurological:      Mental Status: She is alert and oriented to person, place, and time.      Cranial Nerves: No cranial nerve deficit.         Result Review    Result Review:  I have personally reviewed the results  from the time of this admission to 5/20/2023 02:37 EDT and agree with these findings:  [x]  Laboratory list / accordion  []  Microbiology  [x]  Radiology  []  EKG/Telemetry   []  Cardiology/Vascular   []  Pathology  []  Old records  []  Other:  Most notable findings include: Troponin T here minimally elevated at 39 with a repeat of 38.  BMP is largely unremarkable, BUN/creatinine 9/0.66 respectively.  HgbA1c is well controlled at 6.30%.    Lactate within normal limits at 0.8, procalcitonin 0.05.    CBC with WBC 12.32, hemoglobin 11.2 and platelets within normal limits at 296.    Blood cultures are ordered and pending.    Echocardiogram 12/19/2020:  Interpretation Summary    • Left ventricular ejection fraction appears to be 31 - 35%. Left ventricular systolic function is severely decreased. Septal wall motion is abnormal, consistent with a bundle branch block. The left ventricular cavity is mild to moderately dilated. Left ventricular diastolic function is consistent with (grade II w/high LAP) pseudonormalization.  • Mild mitral valve regurgitation is present  • There is a trivial pericardial effusion    Assessment / Plan     Esme Francois is a 47 y.o. female who presents in transfer for shortness of air and hemoptysis found to be due to a left-sided community-acquired pneumonia; patient also with mild troponin T elevation in the setting of a past medical history of nonobstructive coronary artery disease and combined CHF.    #Left upper lobe and left lower lobe community-acquired pneumonia, present on admission  #Sepsis criteria is met with tachycardia and mild leukocytosis, present on admission and secondary to above  #Mild troponin T elevation, thought to be secondary to above  #Chronic combined heart failure  #Nonobstructive coronary artery disease  #History of previous left bundle branch block  #NIDDM type II with HgbA1c 6.3%  #Morbid obesity with BMI 43.52 kg/m²  #Reported history of KENDALL, compliant with  CPAP  #Former smoker  #Chronic normocytic anemia  #Anxiety/depression  #GERD  #History of reported seizures  -Patient has been admitted to the telemetry unit  -I have requested a respiratory culture and testing for Streptococcus and mycoplasma  -Patient will be started on antibiotic therapy with Rocephin and doxycycline  -If patient does not show clinical improvement, may consider addition of an antifungal medication given that patient's symptoms started shortly after she was cleaning a an older condemned home  -I have requested incentive spirometer  -Supportive care for cough with as needed Tessalon Perles or Robitussin  -I will update the patient's echocardiogram  -I have resumed the patient's home aspirin, atorvastatin, beta-blocker, Jardiance and Entresto  -Patient on digoxin (?); will obtain a level and continue for now  -Baseline EKG ordered and pending  -Resume the patient's home fluoxetine for now pending EKG evaluation  -Resume home hydroxyzine for anxiety  -Resume PPI  -Patient has not on antiepileptic medications; will continue to monitor for now    Repeat chemistry panel and CBC in a.m.; monitor temperature curve. Further management pending clinical course and culture data results.     DVT prophylaxis:  Lovenox    CODE STATUS:    FULL    Admission Status:  I believe this patient meets inpatient status.    Rose Marie Le, DO

## 2023-05-20 NOTE — PHARMACY PATIENT ASSISTANCE
Pharmacy checked on the price of Entresto for the patient. It is covered on their insurance with a co-pay of $0.     Pharmacy also checked on the price of Jardiance for the patient. It is covered on their insurance with a co-pay of $0.    Thank you,   Tina Loaiza, PharmD 05/20/23 17:26 EDT

## 2023-05-21 LAB
ANION GAP SERPL CALCULATED.3IONS-SCNC: 11 MMOL/L (ref 5–15)
BASOPHILS # BLD AUTO: 0.02 10*3/MM3 (ref 0–0.2)
BASOPHILS NFR BLD AUTO: 0.2 % (ref 0–1.5)
BH CV ECHO MEAS - ACS: 2.6 CM
BH CV ECHO MEAS - AO MAX PG: 7.5 MMHG
BH CV ECHO MEAS - AO MEAN PG: 4 MMHG
BH CV ECHO MEAS - AO ROOT DIAM: 3.2 CM
BH CV ECHO MEAS - AO V2 MAX: 137 CM/SEC
BH CV ECHO MEAS - AO V2 VTI: 23.3 CM
BH CV ECHO MEAS - AVA(I,D): 2.38 CM2
BH CV ECHO MEAS - EDV(MOD-SP2): 149 ML
BH CV ECHO MEAS - EDV(MOD-SP4): 116 ML
BH CV ECHO MEAS - EF(MOD-BP): 37 %
BH CV ECHO MEAS - EF(MOD-SP2): 42.6 %
BH CV ECHO MEAS - EF(MOD-SP4): 33.5 %
BH CV ECHO MEAS - ESV(MOD-SP2): 85.5 ML
BH CV ECHO MEAS - ESV(MOD-SP4): 77.1 ML
BH CV ECHO MEAS - LA DIMENSION: 3.7 CM
BH CV ECHO MEAS - LAT PEAK E' VEL: 15.4 CM/SEC
BH CV ECHO MEAS - LV DIASTOLIC VOL/BSA (35-75): 52.4 CM2
BH CV ECHO MEAS - LV MAX PG: 4.1 MMHG
BH CV ECHO MEAS - LV MEAN PG: 2 MMHG
BH CV ECHO MEAS - LV SYSTOLIC VOL/BSA (12-30): 34.8 CM2
BH CV ECHO MEAS - LV V1 MAX: 101 CM/SEC
BH CV ECHO MEAS - LV V1 VTI: 16 CM
BH CV ECHO MEAS - LVOT AREA: 3.5 CM2
BH CV ECHO MEAS - LVOT DIAM: 2.1 CM
BH CV ECHO MEAS - MED PEAK E' VEL: 10 CM/SEC
BH CV ECHO MEAS - MR MAX PG: 40.3 MMHG
BH CV ECHO MEAS - MR MAX VEL: 315.5 CM/SEC
BH CV ECHO MEAS - MV E MAX VEL: 166 CM/SEC
BH CV ECHO MEAS - PA ACC TIME: 0.09 SEC
BH CV ECHO MEAS - PA PR(ACCEL): 39.4 MMHG
BH CV ECHO MEAS - RAP SYSTOLE: 10 MMHG
BH CV ECHO MEAS - RVSP: 22.8 MMHG
BH CV ECHO MEAS - SI(MOD-SP2): 28.7 ML/M2
BH CV ECHO MEAS - SI(MOD-SP4): 17.6 ML/M2
BH CV ECHO MEAS - SV(LVOT): 55.4 ML
BH CV ECHO MEAS - SV(MOD-SP2): 63.5 ML
BH CV ECHO MEAS - SV(MOD-SP4): 38.9 ML
BH CV ECHO MEAS - TR MAX PG: 12.8 MMHG
BH CV ECHO MEAS - TR MAX VEL: 179 CM/SEC
BH CV ECHO MEASUREMENTS AVERAGE E/E' RATIO: 13.07
BUN SERPL-MCNC: 10 MG/DL (ref 6–20)
BUN/CREAT SERPL: 15.9 (ref 7–25)
CALCIUM SPEC-SCNC: 8.7 MG/DL (ref 8.6–10.5)
CHLORIDE SERPL-SCNC: 104 MMOL/L (ref 98–107)
CO2 SERPL-SCNC: 23 MMOL/L (ref 22–29)
CREAT SERPL-MCNC: 0.63 MG/DL (ref 0.57–1)
DEPRECATED RDW RBC AUTO: 52.9 FL (ref 37–54)
EGFRCR SERPLBLD CKD-EPI 2021: 110.3 ML/MIN/1.73
EOSINOPHIL # BLD AUTO: 0.01 10*3/MM3 (ref 0–0.4)
EOSINOPHIL NFR BLD AUTO: 0.1 % (ref 0.3–6.2)
ERYTHROCYTE [DISTWIDTH] IN BLOOD BY AUTOMATED COUNT: 17.2 % (ref 12.3–15.4)
GLUCOSE SERPL-MCNC: 153 MG/DL (ref 65–99)
HCT VFR BLD AUTO: 34.5 % (ref 34–46.6)
HGB BLD-MCNC: 10.7 G/DL (ref 12–15.9)
IMM GRANULOCYTES # BLD AUTO: 0.08 10*3/MM3 (ref 0–0.05)
IMM GRANULOCYTES NFR BLD AUTO: 0.8 % (ref 0–0.5)
LEFT ATRIUM VOLUME INDEX: 24.1 ML/M2
LYMPHOCYTES # BLD AUTO: 1.9 10*3/MM3 (ref 0.7–3.1)
LYMPHOCYTES NFR BLD AUTO: 18.7 % (ref 19.6–45.3)
MAGNESIUM SERPL-MCNC: 2.2 MG/DL (ref 1.6–2.6)
MAXIMAL PREDICTED HEART RATE: 173 BPM
MCH RBC QN AUTO: 26.2 PG (ref 26.6–33)
MCHC RBC AUTO-ENTMCNC: 31 G/DL (ref 31.5–35.7)
MCV RBC AUTO: 84.6 FL (ref 79–97)
MONOCYTES # BLD AUTO: 0.53 10*3/MM3 (ref 0.1–0.9)
MONOCYTES NFR BLD AUTO: 5.2 % (ref 5–12)
NEUTROPHILS NFR BLD AUTO: 7.62 10*3/MM3 (ref 1.7–7)
NEUTROPHILS NFR BLD AUTO: 75 % (ref 42.7–76)
NRBC BLD AUTO-RTO: 0 /100 WBC (ref 0–0.2)
PLATELET # BLD AUTO: 269 10*3/MM3 (ref 140–450)
PMV BLD AUTO: 10.2 FL (ref 6–12)
POTASSIUM SERPL-SCNC: 3.8 MMOL/L (ref 3.5–5.2)
QT INTERVAL: 448 MS
QTC INTERVAL: 557 MS
RBC # BLD AUTO: 4.08 10*6/MM3 (ref 3.77–5.28)
SODIUM SERPL-SCNC: 138 MMOL/L (ref 136–145)
STRESS TARGET HR: 147 BPM
WBC NRBC COR # BLD: 10.16 10*3/MM3 (ref 3.4–10.8)

## 2023-05-21 PROCEDURE — 25010000002 ENOXAPARIN PER 10 MG: Performed by: INTERNAL MEDICINE

## 2023-05-21 PROCEDURE — 96372 THER/PROPH/DIAG INJ SC/IM: CPT

## 2023-05-21 PROCEDURE — 63710000001 PREDNISONE PER 1 MG: Performed by: STUDENT IN AN ORGANIZED HEALTH CARE EDUCATION/TRAINING PROGRAM

## 2023-05-21 PROCEDURE — 63710000001 PREDNISONE PER 1 MG: Performed by: INTERNAL MEDICINE

## 2023-05-21 PROCEDURE — 25010000002 CEFTRIAXONE PER 250 MG: Performed by: INTERNAL MEDICINE

## 2023-05-21 PROCEDURE — 80048 BASIC METABOLIC PNL TOTAL CA: CPT | Performed by: STUDENT IN AN ORGANIZED HEALTH CARE EDUCATION/TRAINING PROGRAM

## 2023-05-21 PROCEDURE — 85025 COMPLETE CBC W/AUTO DIFF WBC: CPT | Performed by: STUDENT IN AN ORGANIZED HEALTH CARE EDUCATION/TRAINING PROGRAM

## 2023-05-21 PROCEDURE — 83735 ASSAY OF MAGNESIUM: CPT | Performed by: STUDENT IN AN ORGANIZED HEALTH CARE EDUCATION/TRAINING PROGRAM

## 2023-05-21 PROCEDURE — 99233 SBSQ HOSP IP/OBS HIGH 50: CPT

## 2023-05-21 RX ORDER — DOXYCYCLINE 100 MG/1
100 CAPSULE ORAL EVERY 12 HOURS SCHEDULED
Status: DISCONTINUED | OUTPATIENT
Start: 2023-05-21 | End: 2023-05-23 | Stop reason: HOSPADM

## 2023-05-21 RX ORDER — CEFDINIR 300 MG/1
300 CAPSULE ORAL EVERY 12 HOURS SCHEDULED
Status: DISCONTINUED | OUTPATIENT
Start: 2023-05-22 | End: 2023-05-23 | Stop reason: HOSPADM

## 2023-05-21 RX ADMIN — HYDROXYZINE HYDROCHLORIDE 25 MG: 25 TABLET ORAL at 13:25

## 2023-05-21 RX ADMIN — CETIRIZINE HYDROCHLORIDE 10 MG: 10 TABLET, FILM COATED ORAL at 09:23

## 2023-05-21 RX ADMIN — CARVEDILOL 6.25 MG: 6.25 TABLET, FILM COATED ORAL at 09:23

## 2023-05-21 RX ADMIN — GUAIFENESIN 1200 MG: 600 TABLET, EXTENDED RELEASE ORAL at 20:36

## 2023-05-21 RX ADMIN — GUAIFENESIN 1200 MG: 600 TABLET, EXTENDED RELEASE ORAL at 09:23

## 2023-05-21 RX ADMIN — DOXYCYCLINE 100 MG: 100 INJECTION, POWDER, LYOPHILIZED, FOR SOLUTION INTRAVENOUS at 02:26

## 2023-05-21 RX ADMIN — EMPAGLIFLOZIN 25 MG: 25 TABLET, FILM COATED ORAL at 09:23

## 2023-05-21 RX ADMIN — FLUOXETINE HYDROCHLORIDE 20 MG: 20 CAPSULE ORAL at 09:23

## 2023-05-21 RX ADMIN — HYDROXYZINE HYDROCHLORIDE 25 MG: 25 TABLET ORAL at 09:23

## 2023-05-21 RX ADMIN — HYDROXYZINE HYDROCHLORIDE 50 MG: 50 TABLET ORAL at 20:36

## 2023-05-21 RX ADMIN — DOXYCYCLINE 100 MG: 100 CAPSULE ORAL at 17:32

## 2023-05-21 RX ADMIN — SODIUM CHLORIDE 2 G: 9 INJECTION, SOLUTION INTRAVENOUS at 04:08

## 2023-05-21 RX ADMIN — PREDNISONE 40 MG: 20 TABLET ORAL at 09:23

## 2023-05-21 RX ADMIN — ENOXAPARIN SODIUM 40 MG: 40 INJECTION SUBCUTANEOUS at 05:26

## 2023-05-21 RX ADMIN — PANTOPRAZOLE SODIUM 40 MG: 40 TABLET, DELAYED RELEASE ORAL at 05:26

## 2023-05-21 RX ADMIN — ATORVASTATIN CALCIUM 80 MG: 40 TABLET, FILM COATED ORAL at 09:23

## 2023-05-21 RX ADMIN — SACUBITRIL AND VALSARTAN 1 TABLET: 49; 51 TABLET, FILM COATED ORAL at 09:23

## 2023-05-21 RX ADMIN — SACUBITRIL AND VALSARTAN 1 TABLET: 49; 51 TABLET, FILM COATED ORAL at 20:36

## 2023-05-21 RX ADMIN — DIGOXIN 125 MCG: 125 TABLET ORAL at 13:25

## 2023-05-21 RX ADMIN — ASPIRIN 81 MG: 81 TABLET, CHEWABLE ORAL at 09:23

## 2023-05-21 NOTE — PLAN OF CARE
Goal Outcome Evaluation:      Pt resting in bed, watching television. Pt has tolerated all interventions. No complaints/concerns. No acute distress noted. Will continue to follow the plan of care.

## 2023-05-21 NOTE — PROGRESS NOTES
Patient Identification:  Name:  Esme Francois  Age:  47 y.o.  Sex:  female  :  1975  MRN:  [id number]  Visit Number:  [id number]  Primary Care Provider:  Bobby Dooley DO    Length of stay:  1    Subjective/Interval History/Consultants/Procedures     Chief complaint: Follow-up, CAP and indeterminate troponin elevation     Subjective/Interval History:  Esme Francois is a 47 y.o. female patient admitted after midnight on 2023 as a transfer from [facility] for pneumonia and elevated troponin. She has a pMH significant for chronic combined HFrEF, nonobstructive coronary artery disease, chronic left bundle branch block, hypertension, KENDALL, obesity by BMI, and former tobacco abuse. For further and complete admitting details, please see admission H&P.     She was admitted to the Telemetry floor for further monitoring, evaluation, and treatment. Patient reported shortness of breath which began 2-3 days prior to arrival. She also reported productive cough with occasional blood-streaked sputum. During episodes of cough, patient states that she had some palpitations. Denied any chest pain or chest pressure. CT of the chest from outside hospital revealed patchy infiltrates in the perihilar region, left upper lobe and left lower lobe that were suspicious for pneumonia. There was no evidence of pulmonary edema or PE. White count only mildly elevated at 12K. Procal and lactate negative. Placed on IV Rocephin and Doxycycline on admission for empiric pneumonia coverage. Leukocytosis resolved and patient has clinically improved from pneumonia standpoint. De-escalated to oral antibiotic regimen on . Incentive spirometry use encouraged. Troponin was 39 on arrival with -1 delta. EKG revealed NSR in the 90s with L BBB. No evidence of acute ischemia. Continuous cardiac monitoring in place. Most recent echo from 2020 reviewed. Revealed LVEF 31-35% with severely decreased left ventricular systolic function.  Septal wall motion was abnormal, consistent with a bundle branch block. Also noted mild mitral valve regurg and trivial pericardial effusion. Home medications resumed for GDMT including aspirin, high-dose statin, beta blocker (Coreg), Jardiance, Entresto, and Digoxin. Stress test completed revealing intermediate risk with indeterminate ECG stress. Noted large-sized infarct located in the inferior wall with mild angela infarct ischemia. Transthoracic echocardiogram revealed further decrease in EF to 21-25% with severely reduced left ventricular systolic function. Patient previously referred for pacemaker/defibrillator, however she refused. She states that she still would not be interested in a pacemaker. She had a LifeVest previously, but it was returned due to noncompliance.    Procedures/Imaging:  · Transthoracic echocardiogram  · Stress test with myocardial perfusion  · Cardiac catheterization planned for 5/22/23    Consults:  · Cardiology    On today's exam, patient was sitting up in bed eating breakfast. No s/s acute distress noted. She denies any chest pain, shortness of breath, palpitations, dizziness, or other acute symptoms. She is on room air. No peripheral edema. She has occasional non-productive cough. Afebrile. Vital signs stable. Stress test intermediate risk. Echo read still pending at time of exam. Awaiting further recs from cardiology. Discussed plan with patient. She stated that she would be agreeable to cardiac cath if cardiology advises it.       Discussed with AM MACHO Kimble with no acute events overnight. Room location at the time of evaluation was 311B. Discussed with attending physician, Sanford Oliva DO.   ----------------------------------------------------------------------------------------------------------------------  Current [facility] Meds:  aspirin, 81 mg, Oral, Daily  atorvastatin, 80 mg, Oral, Daily  carvedilol, 6.25 mg, Oral, BID With Meals  [START ON 5/22/2023] cefdinir, 300 mg,  Oral, Q12H  cetirizine, 10 mg, Oral, Daily  digoxin, 125 mcg, Oral, Daily  doxycycline, 100 mg, Oral, Q12H  empagliflozin, 25 mg, Oral, Daily  enoxaparin, 40 mg, Subcutaneous, Q24H  FLUoxetine, 20 mg, Oral, Daily  guaiFENesin, 1,200 mg, Oral, Q12H  hydrOXYzine, 25 mg, Oral, 2 times per day  hydrOXYzine, 50 mg, Oral, Nightly  pantoprazole, 40 mg, Oral, Q AM  predniSONE, 40 mg, Oral, Daily With Breakfast  sacubitril-valsartan, 1 tablet, Oral, BID         ----------------------------------------------------------------------------------------------------------------------      Objective     Vital Signs:  Temp:  [97.8 °F (36.6 °C)-98.8 °F (37.1 °C)] 98.1 °F (36.7 °C)  Heart Rate:  [77-94] 91  Resp:  [18-20] 20  BP: ()/(47-58) 90/47      05/20/23  0130 05/21/23  0500   Weight: 119 kg (261 lb 8 oz) 119 kg (263 lb 3.2 oz)     Body mass index is 43.8 kg/m².    Intake/Output Summary (Last 24 hours) at 5/21/2023 1240  Last data filed at 5/20/2023 1820  Gross per 24 hour   Intake 720 ml   Output 300 ml   Net 420 ml     No intake/output data recorded.  Diet: Cardiac Diets; Healthy Heart (2-3 Na+); Texture: Regular Texture (IDDSI 7); Fluid Consistency: Thin (IDDSI 0)  ----------------------------------------------------------------------------------------------------------------------    Physical Exam  Vitals and nursing note reviewed.   Constitutional:       General: She is awake. She is not in acute distress.     Appearance: She is obese. She is not diaphoretic.      Comments: Room air.   HENT:      Head: Normocephalic and atraumatic.      Mouth/Throat:      Mouth: Mucous membranes are moist.      Pharynx: Oropharynx is clear.   Eyes:      Extraocular Movements: Extraocular movements intact.      Pupils: Pupils are equal, round, and reactive to light.   Cardiovascular:      Rate and Rhythm: Normal rate and regular rhythm.      Pulses: Normal pulses.           Dorsalis pedis pulses are 2+ on the right side and 2+ on the  left side.      Heart sounds: No murmur heard.    No friction rub.      Comments: Heart sounds slightly distant.   Pulmonary:      Effort: Pulmonary effort is normal. No accessory muscle usage, respiratory distress or retractions.      Breath sounds: Decreased breath sounds present. No wheezing, rhonchi or rales.   Abdominal:      General: Bowel sounds are normal. There is no distension.      Palpations: Abdomen is soft.      Tenderness: There is no abdominal tenderness. There is no guarding.   Musculoskeletal:      Cervical back: Neck supple. No rigidity.      Right lower leg: No edema.      Left lower leg: No edema.   Skin:     General: Skin is warm and dry.      Capillary Refill: Capillary refill takes 2 to 3 seconds.      Coloration: Skin is pale.   Neurological:      Mental Status: She is alert and oriented to person, place, and time. Mental status is at baseline.      Cranial Nerves: No dysarthria or facial asymmetry.      Sensory: Sensation is intact. No sensory deficit.      Motor: No weakness or tremor.   Psychiatric:         Attention and Perception: Attention and perception normal.         Mood and Affect: Mood and affect normal.         Speech: Speech normal.         Behavior: Behavior normal. Behavior is cooperative.         Thought Content: Thought content normal.         Cognition and Memory: Cognition normal.         Judgment: Judgment normal.         ----------------------------------------------------------------------------------------------------------------------  Tele:  Appearing normal sinus rhythm 70s on my exam.   ----------------------------------------------------------------------------------------------------------------------  Results from last 7 days   Lab Units 05/20/23  0421 05/20/23 0218   HSTROP T ng/L 38* 39*     Results from last 7 days   Lab Units 05/21/23  0315 05/20/23 0218   LACTATE mmol/L  --  0.8   WBC 10*3/mm3 10.16 12.32*   HEMOGLOBIN g/dL 10.7* 11.2*   HEMATOCRIT %  34.5 35.9   MCV fL 84.6 83.9   MCHC g/dL 31.0* 31.2*   PLATELETS 10*3/mm3 269 296         Results from last 7 days   Lab Units 05/21/23  0315 05/20/23  0218   SODIUM mmol/L 138 135*   POTASSIUM mmol/L 3.8 3.8   MAGNESIUM mg/dL 2.2  --    CHLORIDE mmol/L 104 100   CO2 mmol/L 23.0 23.7   BUN mg/dL 10 9   CREATININE mg/dL 0.63 0.66   CALCIUM mg/dL 8.7 8.8   GLUCOSE mg/dL 153* 94   Estimated Creatinine Clearance: 142.6 mL/min (by C-G formula based on SCr of 0.63 mg/dL).  No results found for: AMMONIA  Results from last 7 days   Lab Units 05/20/23  0912   CHOLESTEROL mg/dL 136   TRIGLYCERIDES mg/dL 127   HDL CHOL mg/dL 38*   LDL CHOL mg/dL 75     Blood Culture   Date Value Ref Range Status   05/20/2023 No growth at 24 hours  Preliminary   05/20/2023 No growth at 24 hours  Preliminary     ----------------------------------------------------------------------------------------------------------------------  Imaging Results (Last 24 Hours)     ** No results found for the last 24 hours. **        ----------------------------------------------------------------------------------------------------------------------   I have reviewed the above laboratory values for 05/21/23    Assessment/Plan     Active Hospital Problems    Diagnosis  POA   • **Pneumonia [J18.9]  Yes         ASSESSMENT/PLAN:  -Left-sided community-acquired pneumonia, present on admission  -Sepsis criteria met on arrival with tachycardia and mild leukocytosis  -Indeterminate troponin elevation, present on admission  -Chronic HFrEF, appearing compensated  -History of nonobstructive CAD  -Chronic left bundle branch block  -Non-insulin dependent type II DM with HgbA1c 6.3%  -Morbid obesity with BMI 43.80 kg/m2  -KENDALL, compliant with CPAP  -Former tobacco abuse  -Chronic normocytic anemia  -Anxiety/depression  -GERD  -History of reported seizures  -Rocephin and Doxycycline initiated on admission for treatment of CAP. Patient has clinically improved from pneumonia  standpoint. Leukocytosis has resolved. Transitioned to oral antibiotic regimen on 5/21. Sputum culture pending. S. Pneumo Ag negative.   -Peripheral blood cultures pending. No growth at 24 hours.  -Stable on room air. Continuous pulse oximetry in place.   -Encourage incentive spirometry use.   -GDMT continued for chronic HFrEF (aspirin, high-dose statin, beta blocker, Entresto, Jardiance, and digoxin).   -Previous EF 31-35% in 2020. Patient previously refused ICD placement. LifeVest was returned due to noncompliance.   -Updated echo obtained. Revealed decrease in EF to 21-25%, now with severely reduced left ventricular systolic function.   -Stress test findings consistent with intermediate risk study. Indeterminate ECG stress.   -Cardiology consult placed. Recommending cardiac catheterization in the a.m. NPO after midnight.   -Continuous cardiac monitoring in place.       -----------  -DVT prophylaxis: Lovenox.  -GI prophylaxis: PPI.  -Activity: Up ad melvin.  -Disposition plans/anticipated needs: Plans to return home on discharge once medically stable.  -Diet: Regular, Cardiac. NPO after midnight tonight for cardiac cath in the a.m.  -Home supplemental O2: N/A, stable on room air.       The patient is high risk due to the following diagnoses/reasons: sepsis due to left-sided community-acquired pneumonia and indeterminate troponin elevation with evidence of further decreased EF and intermediate risk stress test.         LEONIE Luna  05/21/23  12:40 EDT  Pager #[phone]

## 2023-05-21 NOTE — CONSULTS
Inpatient Cardiology Consult  Consult performed by: Marlena Orantes APRN  Consult ordered by: Orestes Huertas DO        Date of Admit: 5/20/2023  Date of Consult: 05/21/23  Provider, No Known  Esme Francois  1975    Consulting Physician: García Bravo MD    Cardiology consultation    Reason for consultation: Abnormal stress, non-STEMI, shortness of air, HFrEF      History of Present Illness    Subjective     No chief complaint on file.      Esme Francois is a 47 y.o. female with past medical history significant for nonischemic cardiomyopathy, nonobstructive coronary artery disease per cardiac cath of 12/19/2020, hypertension, dyslipidemia, left bundle branch block, obstructive sleep apnea, depression, and obesity.  Patient was transferred from [facility] for progressive cough, shortness of air and hemoptysis.  The patient reported that on Monday and Tuesday of this last week she and her mother were cleaning out an older/condemned home and on or about Wednesday she started experiencing shortness of breath that has worsened since that time.  She reported a productive cough and occasional blood-streaked sputum and one small episode of hemoptysis upon arrival to the outside hospital.  She reported palpitations during a coughing episode.  She denies any chest pain.  Patient was admitted for further evaluation and management.  Cardiology was consulted for abnormal stress, non-STEMI, shortness of air and HFrEF.    Patient was seen and examined.  Patient reported events as stated above.  Patient was lying in bed, HOB flat, no oxygen on and was in no apparent distress.  She denies any current or recent chest pain.  She states she is breathing better.    Review of patient's chart shows that she had a cardiac catheterization at [facility] on 10/17/2018.  The cath report is not available however discharge summary from that admission states that she had nonobstructive CAD.    Patient was  hospitalized in December 2020 and found to have decreased LVEF.  She was discharged home with LifeVest.  According to her usual cardiologist, Dr. Vogel, her LVEF remained at around 35% and she refused AICD implantation.    Cardiac risk factors:arteriosclerotic heart disease, hypercholesterolemia, hypertension, Sedentary life style and Obesity    Last Echo: 5/20/2023    Interpretation Summary       •  Left ventricular systolic function is severely decreased. Left ventricular ejection fraction appears to be 21 - 25%.  •  The left ventricular cavity is dilated.  •  There is no evidence of pericardial effusion         12/19/2020    Interpretation Summary    • Left ventricular ejection fraction appears to be 31 - 35%. Left ventricular systolic function is severely decreased. Septal wall motion is abnormal, consistent with a bundle branch block. The left ventricular cavity is mild to moderately dilated. Left ventricular diastolic function is consistent with (grade II w/high LAP) pseudonormalization.  • Mild mitral valve regurgitation is present  • There is a trivial pericardial effusion    Last Stress: 5/20/2023    Interpretation Summary       •  Myocardial perfusion imaging indicates a large-sized infarct located in the inferior wall with mild angela infarct ischemia noted. Anteroseptal perfusion defect likely secondary to LBBB  •  Left ventricular ejection fraction is moderately reduced (Calculated EF = 29%).  •  Myocardial perfusion imaging indicates a located in the inferior wall .  •  Impressions are consistent with an intermediate risk study.  •  Breast attenuation and diaphragmatic attenuation artifacts are present.  •  Findings consistent with an indeterminate ECG stress test.      Last Cath: Reviewed records from the [facility].  Patient admitted on 10/15/2018 and underwent cardiac catheterization on 10/17/2018.  Unfortunately I am not able to read the report however in the discharge summary it states  that the cath found nonobstructive coronary artery disease, exact details are unknown.      Past Medical History:   Diagnosis Date   • CAD (coronary artery disease) 12/19/2020    Non obstructive   • Depression    • GERD (gastroesophageal reflux disease)    • Hyperlipidemia    • Hypertension    • Ischemic cardiomyopathy 12/19/2020   • LBBB (left bundle branch block)    • KENDALL (obstructive sleep apnea)     CPAP   • Seizures     last sz was one month ago     Past Surgical History:   Procedure Laterality Date   • APPENDECTOMY  2014   • CARDIAC CATHETERIZATION     • CHOLECYSTECTOMY  1996   • TUBAL ABDOMINAL LIGATION  2003     Family History   Problem Relation Age of Onset   • Diabetes Mother    • Hypertension Mother    • Breast cancer Mother 53   • Heart attack Father         x2   • Hypertension Father    • Asthma Brother    • Depression Brother    • Scoliosis Brother    • Aneurysm Maternal Grandmother         brain   • No Known Problems Paternal Grandmother    • Cancer Maternal Aunt         thyroid cancer   • No Known Problems Maternal Grandfather    • No Known Problems Paternal Grandfather    • Cancer Maternal Uncle         colon cancer     Social History     Tobacco Use   • Smoking status: Former     Packs/day: 1.00     Years: 18.00     Pack years: 18.00     Types: Cigarettes   • Smokeless tobacco: Never   • Tobacco comments:     sometimes more   Vaping Use   • Vaping Use: Never used   Substance Use Topics   • Alcohol use: Yes     Comment: on occassion    • Drug use: Not Currently     Comment: every now and then     Medications Prior to Admission   Medication Sig Dispense Refill Last Dose   • aspirin 81 MG chewable tablet Chew 1 tablet Daily.   5/19/2023   • atorvastatin (LIPITOR) 80 MG tablet Take 1 tablet by mouth Daily.   5/19/2023   • carvedilol (COREG) 6.25 MG tablet Take 1 tablet by mouth 2 (Two) Times a Day With Meals.   5/19/2023 at AM   • dapagliflozin Propanediol (Farxiga) 10 MG tablet Take  by mouth.    5/19/2023   • digoxin (LANOXIN) 125 MCG tablet Take 1 tablet by mouth Daily.   5/19/2023   • FLUoxetine (PROzac) 20 MG capsule Take 1 capsule by mouth Daily.   5/19/2023   • hydrOXYzine pamoate (VISTARIL) 25 MG capsule Take 1 capsule by mouth 2 (Two) Times a Day.   5/19/2023 at AM   • loratadine (CLARITIN) 10 MG tablet Take 1 tablet by mouth Daily.   5/19/2023   • omeprazole (priLOSEC) 40 MG capsule Take 1 capsule by mouth Daily.   5/19/2023   • sacubitril-valsartan (Entresto) 49-51 MG tablet Take 1 tablet by mouth 2 (Two) Times a Day.   5/19/2023 at AM   • hydrOXYzine pamoate (VISTARIL) 25 MG capsule Take 2 capsules by mouth Every Night.   5/18/2023     Allergies:  Keflex [cephalexin], Tricor [fenofibrate], Metoprolol, Adhesive tape, Latex, and Tylenol with codeine #3 [acetaminophen-codeine]    Review of Systems   Constitutional: Negative for fatigue.   Respiratory: Positive for cough and shortness of breath.    Cardiovascular: Negative for chest pain, palpitations and leg swelling.   Gastrointestinal: Negative for blood in stool.   Neurological: Negative for dizziness, syncope, weakness and light-headedness.   Hematological: Does not bruise/bleed easily.   All other systems reviewed and are negative.        Objective      Vital Signs  Temp:  [97.8 °F (36.6 °C)-98.8 °F (37.1 °C)] 97.8 °F (36.6 °C)  Heart Rate:  [77-94] 77  Resp:  [18-20] 20  BP: ()/(50-58) 111/54  Body mass index is 43.8 kg/m².    Intake/Output Summary (Last 24 hours) at 5/21/2023 0851  Last data filed at 5/20/2023 1820  Gross per 24 hour   Intake 720 ml   Output 300 ml   Net 420 ml       Physical Exam  Vitals reviewed.   Constitutional:       Appearance: Normal appearance. She is well-developed.   HENT:      Head: Normocephalic and atraumatic.   Neck:      Vascular: No JVD.   Cardiovascular:      Rate and Rhythm: Normal rate and regular rhythm.      Heart sounds: No murmur heard.    No friction rub. No gallop.   Pulmonary:      Effort:  Pulmonary effort is normal. No respiratory distress.      Breath sounds: Decreased breath sounds present. No wheezing or rales.   Abdominal:      Palpations: Abdomen is soft. There is no mass.      Tenderness: There is no abdominal tenderness.      Hernia: No hernia is present.   Skin:     General: Skin is warm and dry.   Neurological:      Mental Status: She is alert and oriented to person, place, and time.   Psychiatric:         Mood and Affect: Mood normal.         Behavior: Behavior normal.         Telemetry: Sinus 60s to 80s    Results Review:   I reviewed the patient's new clinical results.  Results from last 7 days   Lab Units 05/20/23  0421 05/20/23  0218   HSTROP T ng/L 38* 39*     Results from last 7 days   Lab Units 05/21/23  0315 05/20/23 0218   WBC 10*3/mm3 10.16 12.32*   HEMOGLOBIN g/dL 10.7* 11.2*   PLATELETS 10*3/mm3 269 296     Results from last 7 days   Lab Units 05/21/23  0315 05/20/23 0218   SODIUM mmol/L 138 135*   POTASSIUM mmol/L 3.8 3.8   CHLORIDE mmol/L 104 100   CO2 mmol/L 23.0 23.7   BUN mg/dL 10 9   CREATININE mg/dL 0.63 0.66   CALCIUM mg/dL 8.7 8.8   GLUCOSE mg/dL 153* 94     Lab Results   Component Value Date    INR 1.01 12/18/2020     Lab Results   Component Value Date    MG 2.2 05/21/2023    MG 1.9 12/18/2020     Lab Results   Component Value Date    TSH 0.851 12/18/2020    TRIG 127 05/20/2023    HDL 38 (L) 05/20/2023    LDL 75 05/20/2023      Lab Results   Component Value Date    BNP 10.0 09/07/2016        EKG:       Imaging Results (Last 72 Hours)     ** No results found for the last 72 hours. **           Assessment:  1. Cardiomyopathy, ischemic versus nonischemic with echocardiogram showing LVEF of 20 to 25%.  Nuclear stress test of 5/20/2023 shows a large inferior infarct which was not seen on previous stress test.  Echocardiogram of 2020 shows LVEF of 30 to 35%.  Patient was hospitalized here at that time and was discharged home with LifeVest.  According to a note by   Jaspreet, patient's cardiologist, the patient declined AICD.  2. Non-STEMI, likely type II in the presence of hypoxemia.  Mildly elevated high-sensitivity troponin delta less than 4  3. Nonobstructive coronary artery disease per cardiac catheterization of October 2018 at [facility].  Exact details are not known.  4. Pneumonia, care per hospitalist team  5. Sepsis, care per hospitalist team.  6. Noninsulin-dependent type 2 diabetes  7. Morbid obesity  8. Obstructive sleep apnea, on CPAP      Recommendations:  1. Due to worsening cardiomyopathy and abnormal nuclear stress test we do recommend cardiac catheterization.  The risks and benefits were discussed with the patient.  We will plan for this in the a.m.  2. Patient to continue atorvastatin and carvedilol.  Aspirin added for CAD  3. Patient on GDMT for cardiomyopathy including Entresto, Jardiance, and carvedilol.  Continue.  She is intolerant of Aldactone.     Thank you very much for asking us to be involved in this patient's care.  We will follow along with you.    Electronically signed by LEONIE Conley, 05/21/23, 1:55 PM EDT.

## 2023-05-21 NOTE — PLAN OF CARE
Goal Outcome Evaluation:  Pt resting in bed at this time. No complaints. No acute signs of distress. Will continue to follow plan of care.

## 2023-05-22 LAB
ANION GAP SERPL CALCULATED.3IONS-SCNC: 9.5 MMOL/L (ref 5–15)
B-HCG UR QL: NEGATIVE
BASOPHILS # BLD AUTO: 0.05 10*3/MM3 (ref 0–0.2)
BASOPHILS NFR BLD AUTO: 0.4 % (ref 0–1.5)
BUN SERPL-MCNC: 12 MG/DL (ref 6–20)
BUN/CREAT SERPL: 17.4 (ref 7–25)
CALCIUM SPEC-SCNC: 8.9 MG/DL (ref 8.6–10.5)
CHLORIDE SERPL-SCNC: 102 MMOL/L (ref 98–107)
CO2 SERPL-SCNC: 24.5 MMOL/L (ref 22–29)
CREAT SERPL-MCNC: 0.69 MG/DL (ref 0.57–1)
DEPRECATED RDW RBC AUTO: 52.5 FL (ref 37–54)
EGFRCR SERPLBLD CKD-EPI 2021: 107.9 ML/MIN/1.73
EOSINOPHIL # BLD AUTO: 0.05 10*3/MM3 (ref 0–0.4)
EOSINOPHIL NFR BLD AUTO: 0.4 % (ref 0.3–6.2)
ERYTHROCYTE [DISTWIDTH] IN BLOOD BY AUTOMATED COUNT: 17.2 % (ref 12.3–15.4)
GLUCOSE SERPL-MCNC: 117 MG/DL (ref 65–99)
HCT VFR BLD AUTO: 36.9 % (ref 34–46.6)
HGB BLD-MCNC: 11.5 G/DL (ref 12–15.9)
IMM GRANULOCYTES # BLD AUTO: 0.1 10*3/MM3 (ref 0–0.05)
IMM GRANULOCYTES NFR BLD AUTO: 0.8 % (ref 0–0.5)
LYMPHOCYTES # BLD AUTO: 3.36 10*3/MM3 (ref 0.7–3.1)
LYMPHOCYTES NFR BLD AUTO: 26.7 % (ref 19.6–45.3)
MCH RBC QN AUTO: 26.6 PG (ref 26.6–33)
MCHC RBC AUTO-ENTMCNC: 31.2 G/DL (ref 31.5–35.7)
MCV RBC AUTO: 85.4 FL (ref 79–97)
MONOCYTES # BLD AUTO: 0.7 10*3/MM3 (ref 0.1–0.9)
MONOCYTES NFR BLD AUTO: 5.6 % (ref 5–12)
NEUTROPHILS NFR BLD AUTO: 66.1 % (ref 42.7–76)
NEUTROPHILS NFR BLD AUTO: 8.34 10*3/MM3 (ref 1.7–7)
NRBC BLD AUTO-RTO: 0 /100 WBC (ref 0–0.2)
PLATELET # BLD AUTO: 324 10*3/MM3 (ref 140–450)
PMV BLD AUTO: 9.7 FL (ref 6–12)
POTASSIUM SERPL-SCNC: 3.7 MMOL/L (ref 3.5–5.2)
RBC # BLD AUTO: 4.32 10*6/MM3 (ref 3.77–5.28)
SODIUM SERPL-SCNC: 136 MMOL/L (ref 136–145)
WBC NRBC COR # BLD: 12.6 10*3/MM3 (ref 3.4–10.8)

## 2023-05-22 PROCEDURE — 94799 UNLISTED PULMONARY SVC/PX: CPT

## 2023-05-22 PROCEDURE — 25010000002 HEPARIN (PORCINE) PER 1000 UNITS: Performed by: INTERNAL MEDICINE

## 2023-05-22 PROCEDURE — 81025 URINE PREGNANCY TEST: CPT | Performed by: INTERNAL MEDICINE

## 2023-05-22 PROCEDURE — C1894 INTRO/SHEATH, NON-LASER: HCPCS | Performed by: INTERNAL MEDICINE

## 2023-05-22 PROCEDURE — C1769 GUIDE WIRE: HCPCS | Performed by: INTERNAL MEDICINE

## 2023-05-22 PROCEDURE — 93454 CORONARY ARTERY ANGIO S&I: CPT | Performed by: INTERNAL MEDICINE

## 2023-05-22 PROCEDURE — 99153 MOD SED SAME PHYS/QHP EA: CPT | Performed by: INTERNAL MEDICINE

## 2023-05-22 PROCEDURE — 25010000002 MIDAZOLAM PER 1 MG: Performed by: INTERNAL MEDICINE

## 2023-05-22 PROCEDURE — 99233 SBSQ HOSP IP/OBS HIGH 50: CPT

## 2023-05-22 PROCEDURE — 25510000001 IOPAMIDOL PER 1 ML: Performed by: INTERNAL MEDICINE

## 2023-05-22 PROCEDURE — 80048 BASIC METABOLIC PNL TOTAL CA: CPT | Performed by: STUDENT IN AN ORGANIZED HEALTH CARE EDUCATION/TRAINING PROGRAM

## 2023-05-22 PROCEDURE — 85025 COMPLETE CBC W/AUTO DIFF WBC: CPT | Performed by: STUDENT IN AN ORGANIZED HEALTH CARE EDUCATION/TRAINING PROGRAM

## 2023-05-22 PROCEDURE — 63710000001 PREDNISONE PER 1 MG: Performed by: INTERNAL MEDICINE

## 2023-05-22 PROCEDURE — 25010000002 FENTANYL CITRATE (PF) 50 MCG/ML SOLUTION: Performed by: INTERNAL MEDICINE

## 2023-05-22 PROCEDURE — 99152 MOD SED SAME PHYS/QHP 5/>YRS: CPT | Performed by: INTERNAL MEDICINE

## 2023-05-22 RX ORDER — CHOLECALCIFEROL (VITAMIN D3) 125 MCG
10 CAPSULE ORAL NIGHTLY PRN
Status: DISCONTINUED | OUTPATIENT
Start: 2023-05-22 | End: 2023-05-23 | Stop reason: HOSPADM

## 2023-05-22 RX ORDER — ACETAMINOPHEN 325 MG/1
650 TABLET ORAL EVERY 4 HOURS PRN
Status: DISCONTINUED | OUTPATIENT
Start: 2023-05-22 | End: 2023-05-22

## 2023-05-22 RX ORDER — LIDOCAINE HYDROCHLORIDE 20 MG/ML
INJECTION, SOLUTION INFILTRATION; PERINEURAL
Status: DISCONTINUED | OUTPATIENT
Start: 2023-05-22 | End: 2023-05-22 | Stop reason: HOSPADM

## 2023-05-22 RX ORDER — NICARDIPINE HCL-0.9% SOD CHLOR 1 MG/10 ML
SYRINGE (ML) INTRAVENOUS
Status: DISCONTINUED | OUTPATIENT
Start: 2023-05-22 | End: 2023-05-22 | Stop reason: HOSPADM

## 2023-05-22 RX ORDER — SODIUM CHLORIDE 9 MG/ML
INJECTION, SOLUTION INTRAVENOUS
Status: COMPLETED | OUTPATIENT
Start: 2023-05-22 | End: 2023-05-22

## 2023-05-22 RX ORDER — SPIRONOLACTONE 25 MG/1
12.5 TABLET ORAL DAILY
Status: DISCONTINUED | OUTPATIENT
Start: 2023-05-22 | End: 2023-05-23 | Stop reason: HOSPADM

## 2023-05-22 RX ORDER — SODIUM CHLORIDE 9 MG/ML
1 INJECTION, SOLUTION INTRAVENOUS CONTINUOUS
Status: ACTIVE | OUTPATIENT
Start: 2023-05-22 | End: 2023-05-22

## 2023-05-22 RX ORDER — MIDAZOLAM HYDROCHLORIDE 1 MG/ML
INJECTION INTRAMUSCULAR; INTRAVENOUS
Status: DISCONTINUED | OUTPATIENT
Start: 2023-05-22 | End: 2023-05-22 | Stop reason: HOSPADM

## 2023-05-22 RX ORDER — ATORVASTATIN CALCIUM 40 MG/1
80 TABLET, FILM COATED ORAL NIGHTLY
Status: DISCONTINUED | OUTPATIENT
Start: 2023-05-22 | End: 2023-05-23 | Stop reason: HOSPADM

## 2023-05-22 RX ORDER — FENTANYL CITRATE 50 UG/ML
INJECTION, SOLUTION INTRAMUSCULAR; INTRAVENOUS
Status: DISCONTINUED | OUTPATIENT
Start: 2023-05-22 | End: 2023-05-22 | Stop reason: HOSPADM

## 2023-05-22 RX ORDER — HEPARIN SODIUM 1000 [USP'U]/ML
INJECTION, SOLUTION INTRAVENOUS; SUBCUTANEOUS
Status: DISCONTINUED | OUTPATIENT
Start: 2023-05-22 | End: 2023-05-22 | Stop reason: HOSPADM

## 2023-05-22 RX ORDER — NITROGLYCERIN 0.4 MG/1
0.4 TABLET SUBLINGUAL
Status: DISCONTINUED | OUTPATIENT
Start: 2023-05-22 | End: 2023-05-23 | Stop reason: HOSPADM

## 2023-05-22 RX ADMIN — SODIUM CHLORIDE 1 ML/KG/HR: 9 INJECTION, SOLUTION INTRAVENOUS at 12:37

## 2023-05-22 RX ADMIN — CETIRIZINE HYDROCHLORIDE 10 MG: 10 TABLET, FILM COATED ORAL at 14:22

## 2023-05-22 RX ADMIN — CEFDINIR 300 MG: 300 CAPSULE ORAL at 14:22

## 2023-05-22 RX ADMIN — CEFDINIR 300 MG: 300 CAPSULE ORAL at 20:09

## 2023-05-22 RX ADMIN — SPIRONOLACTONE 12.5 MG: 25 TABLET ORAL at 14:22

## 2023-05-22 RX ADMIN — DOXYCYCLINE 100 MG: 100 CAPSULE ORAL at 20:09

## 2023-05-22 RX ADMIN — ATORVASTATIN CALCIUM 80 MG: 40 TABLET, FILM COATED ORAL at 20:10

## 2023-05-22 RX ADMIN — SACUBITRIL AND VALSARTAN 1 TABLET: 49; 51 TABLET, FILM COATED ORAL at 14:22

## 2023-05-22 RX ADMIN — FLUOXETINE HYDROCHLORIDE 20 MG: 20 CAPSULE ORAL at 14:22

## 2023-05-22 RX ADMIN — GUAIFENESIN 1200 MG: 600 TABLET, EXTENDED RELEASE ORAL at 14:22

## 2023-05-22 RX ADMIN — ASPIRIN 81 MG: 81 TABLET, CHEWABLE ORAL at 14:22

## 2023-05-22 RX ADMIN — HYDROXYZINE HYDROCHLORIDE 25 MG: 25 TABLET ORAL at 14:22

## 2023-05-22 RX ADMIN — DOXYCYCLINE 100 MG: 100 CAPSULE ORAL at 14:22

## 2023-05-22 RX ADMIN — EMPAGLIFLOZIN 25 MG: 25 TABLET, FILM COATED ORAL at 14:22

## 2023-05-22 RX ADMIN — CARVEDILOL 6.25 MG: 6.25 TABLET, FILM COATED ORAL at 17:45

## 2023-05-22 RX ADMIN — PREDNISONE 40 MG: 20 TABLET ORAL at 14:22

## 2023-05-22 RX ADMIN — HYDROXYZINE HYDROCHLORIDE 50 MG: 50 TABLET ORAL at 20:10

## 2023-05-22 RX ADMIN — GUAIFENESIN 1200 MG: 600 TABLET, EXTENDED RELEASE ORAL at 20:09

## 2023-05-22 RX ADMIN — SACUBITRIL AND VALSARTAN 1 TABLET: 49; 51 TABLET, FILM COATED ORAL at 20:09

## 2023-05-22 NOTE — NURSING NOTE
Cardiac Rehab referral received on patient. After reviewing patient information there is no qualifying diagnosis noted at this time .Qualifications for Cardiac Rehab include Coronary Stenting, AMI (NSTEMI, STEMI), Stable Angina, CABG, Heart Valve Repair/Replacement, Heart Transplant or Stable CHF (with these specific qualifications, Left Ventricular Ejection Fraction of 35% or less, NYHA class II to IV symptoms despite optimal heart failure therapy for at least 6 weeks and has not had a recent (less than or equal to 6 weeks) or planned (less than or equal to 6 months) major cardiovascular hospitalizations or procedures. Due to patient being in the hospital, does not meet criteria at this time) Please contact us at [phone] with questions.    If the diagnosis is CHF when the patient meets the CHF criteria for Cardiac Rehab with a new order we will gladly schedule them.

## 2023-05-22 NOTE — PROGRESS NOTES
Patient Identification:  Name:  Esme Francois  Age:  47 y.o.  Sex:  female  :  1975  MRN:  [id number]  Visit Number:  [id number]  Primary Care Provider:  Bobby Dooley DO    Length of stay:  2    Subjective/Interval History/Consultants/Procedures     Chief complaint: Follow-up, CAP and indeterminate troponin elevation     Subjective/Interval History:  Esme Francois is a 47 y.o. female patient admitted after midnight on 2023 as a transfer from [facility] for pneumonia and elevated troponin. She has a pMH significant for chronic combined HFrEF, nonobstructive coronary artery disease, chronic left bundle branch block, hypertension, KENDALL, obesity by BMI, and former tobacco abuse. For further and complete admitting details, please see admission H&P.     She was admitted to the Telemetry floor for further monitoring, evaluation, and treatment. Patient reported shortness of breath which began 2-3 days prior to arrival. She also reported productive cough with occasional blood-streaked sputum. During episodes of cough, patient states that she had some palpitations. Denied any chest pain or chest pressure. CT of the chest from outside hospital revealed patchy infiltrates in the perihilar region, left upper lobe and left lower lobe that were suspicious for pneumonia. There was no evidence of pulmonary edema or PE. White count only mildly elevated at 12K. Procal and lactate negative. Placed on IV Rocephin and Doxycycline on admission for empiric pneumonia coverage. Patient has clinically improved from pneumonia standpoint. De-escalated to oral antibiotic regimen on . Incentive spirometry use encouraged. Troponin was 39 on arrival with -1 delta. EKG revealed NSR in the 90s with L BBB. No evidence of acute ischemia. Continuous cardiac monitoring in place. Most recent echo from 2020 reviewed. Revealed LVEF 31-35% with severely decreased left ventricular systolic function. Septal wall motion was  abnormal, consistent with a bundle branch block. Also noted mild mitral valve regurg and trivial pericardial effusion. Home medications resumed for GDMT including aspirin, high-dose statin, beta blocker (Coreg), Jardiance, Entresto, and Digoxin. Stress test completed revealing intermediate risk with indeterminate ECG stress. Noted large-sized infarct located in the inferior wall with mild angela infarct ischemia. Transthoracic echocardiogram revealed further decrease in EF to 21-25% with severely reduced left ventricular systolic function. Patient previously referred for pacemaker/defibrillator, however she refused. She states that she still would not be interested in a pacemaker. She had a LifeVest previously, but it was returned due to noncompliance. Cardiology planning for cardiac catheterization today (5/22). Pending results.     Procedures/Imaging:  · Transthoracic echocardiogram  · Stress test with myocardial perfusion  · Cardiac catheterization planned for 5/22/23    Consults:  · Cardiology    On today's exam, patient was lying in bed with no s/s acute distress noted. She states that she did not sleep well overnight and voiced that she needs her home CPAP, but there is no way family can bring it to the hospital. CPAP ordered using our facility's equipment. Discussed with RT. Patient was also concerned that her medications were not ordered correctly. Went over medication list and doses with patient and confirmed that only difference is that she takes her statin nightly instead of daily. Order changed per patient preference. She currently denies any chest pain, shortness of breath, or palpitations. Remains stable on room air. No peripheral edema. Lungs mildly diminished, otherwise clear. Patient requested to speak with Dr. Vogel prior to cardiac cath. Dr. Vogel notified. Discussed with LEONIE Ledezma with cardiology.     Discussed with ABDULLAHI Jones with no acute events overnight. Room location at the  time of evaluation was 311B. Discussed with attending physician, Néstor Prieto DO.   ----------------------------------------------------------------------------------------------------------------------  Current Hospital Meds:  [MAR Hold] aspirin, 81 mg, Oral, Daily  [MAR Hold] atorvastatin, 80 mg, Oral, Nightly  carvedilol, 6.25 mg, Oral, BID With Meals  cefdinir, 300 mg, Oral, Q12H  [MAR Hold] cetirizine, 10 mg, Oral, Daily  [MAR Hold] digoxin, 125 mcg, Oral, Daily  doxycycline, 100 mg, Oral, Q12H  [MAR Hold] empagliflozin, 25 mg, Oral, Daily  [MAR Hold] enoxaparin, 40 mg, Subcutaneous, Q24H  [MAR Hold] FLUoxetine, 20 mg, Oral, Daily  [MAR Hold] guaiFENesin, 1,200 mg, Oral, Q12H  [MAR Hold] hydrOXYzine, 25 mg, Oral, 2 times per day  [MAR Hold] hydrOXYzine, 50 mg, Oral, Nightly  [MAR Hold] pantoprazole, 40 mg, Oral, Q AM  [MAR Hold] predniSONE, 40 mg, Oral, Daily With Breakfast  [MAR Hold] sacubitril-valsartan, 1 tablet, Oral, BID  [MAR Hold] spironolactone, 12.5 mg, Oral, Daily      sodium chloride, , Last Rate: 75 mL/hr (05/22/23 1111)      ----------------------------------------------------------------------------------------------------------------------      Objective     Vital Signs:  Temp:  [97.2 °F (36.2 °C)-98.1 °F (36.7 °C)] 98.1 °F (36.7 °C)  Heart Rate:  [70-91] 86  Resp:  [18-20] 18  BP: ()/(45-84) 126/84      05/20/23  0130 05/21/23  0500 05/22/23  0500   Weight: 119 kg (261 lb 8 oz) 119 kg (263 lb 3.2 oz) 119 kg (262 lb 4.8 oz)     Body mass index is 43.65 kg/m².    Intake/Output Summary (Last 24 hours) at 5/22/2023 1140  Last data filed at 5/21/2023 1300  Gross per 24 hour   Intake 240 ml   Output --   Net 240 ml     No intake/output data recorded.  NPO Diet NPO Type: Strict NPO  ----------------------------------------------------------------------------------------------------------------------    Physical Exam  Vitals and nursing note reviewed.   Constitutional:        General: She is awake. She is not in acute distress.     Appearance: She is obese. She is not diaphoretic.      Comments: Room air.   HENT:      Head: Normocephalic and atraumatic.      Mouth/Throat:      Mouth: Mucous membranes are moist.      Pharynx: Oropharynx is clear.   Eyes:      Extraocular Movements: Extraocular movements intact.      Pupils: Pupils are equal, round, and reactive to light.   Cardiovascular:      Rate and Rhythm: Normal rate and regular rhythm.      Pulses: Normal pulses.           Dorsalis pedis pulses are 2+ on the right side and 2+ on the left side.      Heart sounds: No murmur heard.    No friction rub.      Comments: Heart sounds slightly distant.   Pulmonary:      Effort: Pulmonary effort is normal. No accessory muscle usage, respiratory distress or retractions.      Breath sounds: Decreased breath sounds present. No wheezing, rhonchi or rales.   Abdominal:      General: Bowel sounds are normal. There is no distension.      Palpations: Abdomen is soft.      Tenderness: There is no abdominal tenderness. There is no guarding.   Musculoskeletal:      Cervical back: Neck supple. No rigidity.      Right lower leg: No edema.      Left lower leg: No edema.   Skin:     General: Skin is warm and dry.      Capillary Refill: Capillary refill takes 2 to 3 seconds.      Coloration: Skin is pale.   Neurological:      Mental Status: She is alert and oriented to person, place, and time. Mental status is at baseline.      Cranial Nerves: No dysarthria or facial asymmetry.      Sensory: Sensation is intact. No sensory deficit.      Motor: No weakness or tremor.   Psychiatric:         Attention and Perception: Attention and perception normal.         Mood and Affect: Mood and affect normal.         Speech: Speech normal.         Behavior: Behavior normal. Behavior is cooperative.         Thought Content: Thought content normal.         Cognition and Memory: Cognition normal.         Judgment: Judgment  normal.         ----------------------------------------------------------------------------------------------------------------------  Tele:  Appearing normal sinus rhythm 70s on my exam.   ----------------------------------------------------------------------------------------------------------------------  Results from last 7 days   Lab Units 05/20/23 0421 05/20/23 0218   HSTROP T ng/L 38* 39*     Results from last 7 days   Lab Units 05/22/23 0208 05/21/23 0315 05/20/23 0218   LACTATE mmol/L  --   --  0.8   WBC 10*3/mm3 12.60* 10.16 12.32*   HEMOGLOBIN g/dL 11.5* 10.7* 11.2*   HEMATOCRIT % 36.9 34.5 35.9   MCV fL 85.4 84.6 83.9   MCHC g/dL 31.2* 31.0* 31.2*   PLATELETS 10*3/mm3 324 269 296         Results from last 7 days   Lab Units 05/22/23 0208 05/21/23 0315 05/20/23 0218   SODIUM mmol/L 136 138 135*   POTASSIUM mmol/L 3.7 3.8 3.8   MAGNESIUM mg/dL  --  2.2  --    CHLORIDE mmol/L 102 104 100   CO2 mmol/L 24.5 23.0 23.7   BUN mg/dL 12 10 9   CREATININE mg/dL 0.69 0.63 0.66   CALCIUM mg/dL 8.9 8.7 8.8   GLUCOSE mg/dL 117* 153* 94   Estimated Creatinine Clearance: 130.2 mL/min (by C-G formula based on SCr of 0.69 mg/dL).  No results found for: AMMONIA  Results from last 7 days   Lab Units 05/20/23  0912   CHOLESTEROL mg/dL 136   TRIGLYCERIDES mg/dL 127   HDL CHOL mg/dL 38*   LDL CHOL mg/dL 75     Blood Culture   Date Value Ref Range Status   05/20/2023 No growth at 24 hours  Preliminary   05/20/2023 No growth at 24 hours  Preliminary     ----------------------------------------------------------------------------------------------------------------------  Imaging Results (Last 24 Hours)     ** No results found for the last 24 hours. **        ----------------------------------------------------------------------------------------------------------------------   I have reviewed the above laboratory values for 05/22/23    Assessment/Plan     Active Hospital Problems    Diagnosis  POA   • **Pneumonia [J18.9]   Yes   • CAD (coronary artery disease) [I25.10]  Unknown   • Ischemic cardiomyopathy [I25.5]  Unknown         ASSESSMENT/PLAN:  -Left-sided community-acquired pneumonia, present on admission  -Sepsis criteria met on arrival with tachycardia and mild leukocytosis  -Indeterminate troponin elevation, present on admission  -Chronic HFrEF, appearing compensated  -History of nonobstructive CAD  -Chronic left bundle branch block  -Non-insulin dependent type II DM with HgbA1c 6.3%  -Morbid obesity with BMI 43.80 kg/m2  -KENDALL, compliant with CPAP  -Former tobacco abuse  -Chronic normocytic anemia  -Anxiety/depression  -GERD  -History of reported seizures  -Rocephin and Doxycycline initiated on admission for treatment of CAP. Patient has clinically improved from pneumonia standpoint. Remains stable on room air. Transitioned to oral antibiotic regimen on 5/21. S. Pneumo Ag negative. Sputum culture pending collection.   -Peripheral blood cultures pending. No growth at 2 days.  -Continuous pulse oximetry in place.   -Encourage incentive spirometry use.   -Turn, cough, and deep breathing exercises.  -GDMT continued for chronic HFrEF (aspirin, high-dose statin, beta blocker, Entresto, Jardiance, and digoxin). Previously did not tolerate spironolactone, however this medication was ordered today by Dr. Vogel. Monitor.   -Not on diuretic therapy; no evidence of fluid overload.   -Previous EF 31-35% in 2020. Patient refused ICD placement in the past. LifeVest was returned due to noncompliance.   -Updated echo obtained. Revealed decrease in EF to 21-25%, now with severely reduced left ventricular systolic function.   -Stress test findings consistent with intermediate risk study. Indeterminate ECG stress.   -Cardiology planning for cardiac catheterization today, 5/22. Pending results.  -Continuous cardiac monitoring remains in place.   -CPAP ordered for use nightly while inpatient.     -----------  -DVT prophylaxis: Lovenox.  -GI  prophylaxis: PPI.  -Activity: Up ad melvin.  -Disposition plans/anticipated needs: Plans to return home on discharge once medically stable.  -Diet: Regular, Cardiac. NPO awaiting heart cath.  -Home supplemental O2: N/A, stable on room air.       The patient is high risk due to the following diagnoses/reasons: sepsis due to left-sided community-acquired pneumonia and indeterminate troponin elevation with evidence of further decreased EF and intermediate risk stress test.         LEONIE Luna  05/22/23  11:40 EDT  Pager #[phone]

## 2023-05-22 NOTE — PLAN OF CARE
Goal Outcome Evaluation:      Patient resting in bed throughout shift. Patient is alert and oriented x4. Patient is on RA and NSR on the cardiac monitor. No s/s of distress or discomfort noted. Vss. Patient is NPO for procedure in the AM. Will continue to follow plan of care throughout shift.

## 2023-05-22 NOTE — PLAN OF CARE
Goal Outcome Evaluation: Patient has been pleasant but makes frequently request. Patient request are sometimes easy meet and sometimes there is no chance of making her happy. Patients remains on room air, saturations remaining above 90%. Patient family at bedside, updated on plan of care. Patient ambulates in room, steady gait noted. Patient currently resting in bed. Will continue with plan of care.

## 2023-05-22 NOTE — PROGRESS NOTES
Pharmacy was consulted on smoking cessation and the patient had that they were a former smoker on their chart. I confirmed this with the patient that she is about 2 and a half years smoke free.     Thank you  Charlie Pastrana, Pharmacy Intern  05/22/23  16:17 EDT

## 2023-05-23 VITALS
RESPIRATION RATE: 18 BRPM | DIASTOLIC BLOOD PRESSURE: 81 MMHG | SYSTOLIC BLOOD PRESSURE: 131 MMHG | BODY MASS INDEX: 43.62 KG/M2 | HEART RATE: 91 BPM | TEMPERATURE: 98 F | OXYGEN SATURATION: 98 % | HEIGHT: 65 IN | WEIGHT: 261.8 LBS

## 2023-05-23 PROBLEM — I50.22 CHRONIC SYSTOLIC HEART FAILURE: Status: ACTIVE | Noted: 2023-05-23

## 2023-05-23 PROBLEM — I50.22 CHRONIC HFREF (HEART FAILURE WITH REDUCED EJECTION FRACTION): Status: ACTIVE | Noted: 2023-05-23

## 2023-05-23 PROBLEM — I25.10 CORONARY ARTERY DISEASE INVOLVING NATIVE HEART WITHOUT ANGINA PECTORIS, UNSPECIFIED VESSEL OR LESION TYPE: Status: ACTIVE | Noted: 2023-05-23

## 2023-05-23 LAB
ANION GAP SERPL CALCULATED.3IONS-SCNC: 8.2 MMOL/L (ref 5–15)
BASOPHILS # BLD AUTO: 0.02 10*3/MM3 (ref 0–0.2)
BASOPHILS NFR BLD AUTO: 0.2 % (ref 0–1.5)
BUN SERPL-MCNC: 11 MG/DL (ref 6–20)
BUN/CREAT SERPL: 18.6 (ref 7–25)
CALCIUM SPEC-SCNC: 8.7 MG/DL (ref 8.6–10.5)
CHLORIDE SERPL-SCNC: 101 MMOL/L (ref 98–107)
CO2 SERPL-SCNC: 24.8 MMOL/L (ref 22–29)
CREAT SERPL-MCNC: 0.59 MG/DL (ref 0.57–1)
DEPRECATED RDW RBC AUTO: 53.1 FL (ref 37–54)
EGFRCR SERPLBLD CKD-EPI 2021: 112 ML/MIN/1.73
EOSINOPHIL # BLD AUTO: 0.01 10*3/MM3 (ref 0–0.4)
EOSINOPHIL NFR BLD AUTO: 0.1 % (ref 0.3–6.2)
ERYTHROCYTE [DISTWIDTH] IN BLOOD BY AUTOMATED COUNT: 17.5 % (ref 12.3–15.4)
GLUCOSE SERPL-MCNC: 124 MG/DL (ref 65–99)
HCT VFR BLD AUTO: 35.8 % (ref 34–46.6)
HGB BLD-MCNC: 11.1 G/DL (ref 12–15.9)
IMM GRANULOCYTES # BLD AUTO: 0.12 10*3/MM3 (ref 0–0.05)
IMM GRANULOCYTES NFR BLD AUTO: 1.1 % (ref 0–0.5)
LYMPHOCYTES # BLD AUTO: 2.24 10*3/MM3 (ref 0.7–3.1)
LYMPHOCYTES NFR BLD AUTO: 20.8 % (ref 19.6–45.3)
MCH RBC QN AUTO: 26 PG (ref 26.6–33)
MCHC RBC AUTO-ENTMCNC: 31 G/DL (ref 31.5–35.7)
MCV RBC AUTO: 83.8 FL (ref 79–97)
MONOCYTES # BLD AUTO: 0.55 10*3/MM3 (ref 0.1–0.9)
MONOCYTES NFR BLD AUTO: 5.1 % (ref 5–12)
NEUTROPHILS NFR BLD AUTO: 7.85 10*3/MM3 (ref 1.7–7)
NEUTROPHILS NFR BLD AUTO: 72.7 % (ref 42.7–76)
NRBC BLD AUTO-RTO: 0 /100 WBC (ref 0–0.2)
PLATELET # BLD AUTO: 319 10*3/MM3 (ref 140–450)
PMV BLD AUTO: 9.4 FL (ref 6–12)
POTASSIUM SERPL-SCNC: 4.1 MMOL/L (ref 3.5–5.2)
QT INTERVAL: 470 MS
QTC INTERVAL: 510 MS
RBC # BLD AUTO: 4.27 10*6/MM3 (ref 3.77–5.28)
SODIUM SERPL-SCNC: 134 MMOL/L (ref 136–145)
WBC NRBC COR # BLD: 10.79 10*3/MM3 (ref 3.4–10.8)

## 2023-05-23 PROCEDURE — 93005 ELECTROCARDIOGRAM TRACING: CPT | Performed by: INTERNAL MEDICINE

## 2023-05-23 PROCEDURE — G0378 HOSPITAL OBSERVATION PER HR: HCPCS

## 2023-05-23 PROCEDURE — 99239 HOSP IP/OBS DSCHRG MGMT >30: CPT

## 2023-05-23 PROCEDURE — 63710000001 PREDNISONE PER 1 MG: Performed by: INTERNAL MEDICINE

## 2023-05-23 PROCEDURE — 93010 ELECTROCARDIOGRAM REPORT: CPT | Performed by: INTERNAL MEDICINE

## 2023-05-23 PROCEDURE — 80048 BASIC METABOLIC PNL TOTAL CA: CPT | Performed by: INTERNAL MEDICINE

## 2023-05-23 PROCEDURE — 94799 UNLISTED PULMONARY SVC/PX: CPT

## 2023-05-23 PROCEDURE — 94660 CPAP INITIATION&MGMT: CPT

## 2023-05-23 PROCEDURE — 25010000002 ENOXAPARIN PER 10 MG: Performed by: INTERNAL MEDICINE

## 2023-05-23 PROCEDURE — 85025 COMPLETE CBC W/AUTO DIFF WBC: CPT | Performed by: INTERNAL MEDICINE

## 2023-05-23 RX ORDER — PREDNISONE 20 MG/1
40 TABLET ORAL
Qty: 4 TABLET | Refills: 0 | Status: SHIPPED | OUTPATIENT
Start: 2023-05-23 | End: 2023-05-25

## 2023-05-23 RX ORDER — DOXYCYCLINE 100 MG/1
100 CAPSULE ORAL EVERY 12 HOURS SCHEDULED
Qty: 4 CAPSULE | Refills: 0 | Status: SHIPPED | OUTPATIENT
Start: 2023-05-23 | End: 2023-05-25

## 2023-05-23 RX ORDER — SPIRONOLACTONE 25 MG/1
12.5 TABLET ORAL DAILY
Qty: 15 TABLET | Refills: 0 | Status: SHIPPED | OUTPATIENT
Start: 2023-05-23 | End: 2023-06-22

## 2023-05-23 RX ORDER — CEFDINIR 300 MG/1
300 CAPSULE ORAL EVERY 12 HOURS SCHEDULED
Qty: 4 CAPSULE | Refills: 0 | Status: SHIPPED | OUTPATIENT
Start: 2023-05-23 | End: 2023-05-25

## 2023-05-23 RX ADMIN — CETIRIZINE HYDROCHLORIDE 10 MG: 10 TABLET, FILM COATED ORAL at 08:35

## 2023-05-23 RX ADMIN — SPIRONOLACTONE 12.5 MG: 25 TABLET ORAL at 08:36

## 2023-05-23 RX ADMIN — FLUOXETINE HYDROCHLORIDE 20 MG: 20 CAPSULE ORAL at 08:36

## 2023-05-23 RX ADMIN — SACUBITRIL AND VALSARTAN 1 TABLET: 49; 51 TABLET, FILM COATED ORAL at 08:36

## 2023-05-23 RX ADMIN — PANTOPRAZOLE SODIUM 40 MG: 40 TABLET, DELAYED RELEASE ORAL at 06:23

## 2023-05-23 RX ADMIN — CARVEDILOL 6.25 MG: 6.25 TABLET, FILM COATED ORAL at 08:36

## 2023-05-23 RX ADMIN — GUAIFENESIN 1200 MG: 600 TABLET, EXTENDED RELEASE ORAL at 08:36

## 2023-05-23 RX ADMIN — ENOXAPARIN SODIUM 40 MG: 40 INJECTION SUBCUTANEOUS at 06:23

## 2023-05-23 RX ADMIN — CEFDINIR 300 MG: 300 CAPSULE ORAL at 08:36

## 2023-05-23 RX ADMIN — HYDROXYZINE HYDROCHLORIDE 25 MG: 25 TABLET ORAL at 08:35

## 2023-05-23 RX ADMIN — DOXYCYCLINE 100 MG: 100 CAPSULE ORAL at 08:36

## 2023-05-23 RX ADMIN — PREDNISONE 40 MG: 20 TABLET ORAL at 08:36

## 2023-05-23 RX ADMIN — ASPIRIN 81 MG: 81 TABLET, CHEWABLE ORAL at 08:36

## 2023-05-23 NOTE — CASE MANAGEMENT/SOCIAL WORK
Discharge Planning Assessment  [facility]     Patient Name: Esme Francois  MRN: [id number]  Today's Date: 5/23/2023    Admit Date: 5/20/2023    Plan: This CM spoke with pt via phone to discuss discharge.  Pt reports from apartment with adult daughter Venessa; DME as follows Bi-PAP from [employer], and wrist BP cuff, denies further DME needs however, would like a plug in arm BP cuff, and reports needing supplies for Bi-PAP; denies HH or needs; sees Dr Bobby Dooley; utilizes [facility]; verified payor as [employer] by [employer]; transportation home to be provided by sister in-law; discharge was discussed by physician.  Per pt awaiting Zoll LV to be fitted at time of call.  This CM did notify [employer] rep Alee via VM, awaiting call back to confirm.  This CM did f/u with [employer] regarding supplies for Bi-PAP and spoke with Ara whom relayed that pt could not get supplies d/t non-compliance as pt's must wear 70% of time to recieve supplies.  Crystal checking with respiratory to see what pt could do to establish complaincy so as to recieve supplies, Ara or [employer] RT will call pt back to update.  Per pt request this CM also checked with Candy at [facility] regarding whether insurance covers arm BP with outlet adaptor as pt states she has a writs one that was given to her however, does not believe it's very accurate.  Per Candy they are not covered by insurance however, if pt is interested they can be purchased for $83.00.  This CM updated pt via pt's mobile phone as per pt request.   Discharge Needs Assessment     Row Name 05/23/23 0921       Living Environment    People in Home child(taryn), adult    Name(s) of People in Home donna Francois    Current Living Arrangements apartment    Potentially Unsafe Housing Conditions none    Primary Care Provided by self    Provides Primary Care For no one    Family Caregiver if Needed none    Quality of Family Relationships unable to assess    Able to Return to  Prior Arrangements yes       Resource/Environmental Concerns    Resource/Environmental Concerns none       Transition Planning    Patient/Family Anticipates Transition to home with family    Patient/Family Anticipated Services at Transition none    Transportation Anticipated family or friend will provide       Discharge Needs Assessment    Readmission Within the Last 30 Days no previous admission in last 30 days    Equipment Currently Used at Home bipap;bp cuff    Concerns to be Addressed no discharge needs identified    Anticipated Changes Related to Illness none    Equipment Needed After Discharge none               Discharge Plan     Row Name 05/23/23 0923       Plan    Plan This CM spoke with pt via phone to discuss discharge.  Pt reports from apartment with adult daughter Venessa; DME as follows Bi-PAP from [employer], and wrist BP cuff, denies further DME needs however, would like a plug in arm BP cuff, and reports needing supplies for Bi-PAP; denies HH or needs; sees Dr Bobby Dooley; utilizes [facility]; verified payor as [employer] by [employer]; transportation home to be provided by sister in-law; discharge was discussed by physician.  Per pt awaiting Zoll LV to be fitted at time of call.  This CM did notify [employer] rep Alee via VM, awaiting call back to confirm.  This CM did f/u with [employer] regarding supplies for Bi-PAP and spoke with Ara whom relayed that pt could not get supplies d/t non-compliance as pt's must wear 70% of time to recieve supplies.  Ara checking with respiratory to see what pt could do to establish complaincy so as to recieve supplies, Ara or [employer] RT will call pt back to update.  Per pt request this CM also checked with Candy at [facility] regarding whether insurance covers arm BP with outlet adaptor as pt states she has a writs one that was given to her however, does not believe it's very accurate.  Per Candy they are not covered by insurance however, if pt is  interested they can be purchased for $83.00.  This CM updated pt via pt's mobile phone as per pt request.    Patient/Family in Agreement with Plan yes    Plan Comments 5/22- pt arrived to ED with c/o SOB, and cough after cleaning out an older condemned home; pt NPO for cardiac cath, per most recent attending's note, acquired pneumonia and indeterminate troponin elevation with evidence of further decreased EF and intermediate risk stress test; pt continued on PO Omnicef, doxy, and prednisone; 96% on RA at time of review.              Expected Discharge Date and Time     Expected Discharge Date Expected Discharge Time    May 23, 2023         Yoselin Canas

## 2023-05-23 NOTE — NURSING NOTE
Spoke with Dr. Vogel he is ok with patient leaving without Lifevest and them coming to fit patient at her house.

## 2023-05-23 NOTE — PLAN OF CARE
Goal Outcome Evaluation:      Patient resting in bed throughout shift. Patient is alert and oriented x4. Patient denies any discomfort at this time. Patient does not want to put the telemetry monitor back on due to being allergic to the adhesive in the stickers. Multiple different tele stickers attempted during hospital stay. Provider aware at this time. Patient is currently on RA at this time. Will continue to follow plan of care throughout shift.

## 2023-05-23 NOTE — DISCHARGE SUMMARY
[facility] Emergency Room Medicine Services  DISCHARGE SUMMARY    Date of Admission: 5/20/2023    Date of Discharge:  5/23/2023    PCP: Bobby Dooley DO    Discharging Provider: LEONIE Luna  Attending Physician on day of DC: Néstor Prieto DO    Admission Diagnosis:   Please see admission H&P    Discharge Diagnosis:   · Sepsis due to left-sided community-acquired pneumonia  · Indeterminate troponin elevation, due to non-cardiac factors  · Nonobstructive coronary artery disease status post left cardiac catheterization which revealed 20% stenosis of RCA  · Chronic left bundle branch block  · Chronic HFrEF with significantly reduced left ventricular systolic function (EF 21-25%)  · Non-insulin dependent type II DM, HgbA1c 6.3%  · Morbid obesity with BMI 43.57 kg/m2  · Obstructive sleep apnea, compliant with CPAP nightly  · Former tobacco abuse  · Chronic normocytic anemia  · Anxiety/Depression  · GERD  · Reported history of seizures    Procedures Performed:  • Transthoracic echocardiogram  • Stress test with myocardial perfusion  • Left cardiac catheterization on 5/22/2023 by Dr. Agarwal     Consults:   Consults     Date and Time Order Name Status Description    5/21/2023 12:33 AM Inpatient Cardiology Consult Completed           HPI     History of Present Illness:  Esme Francois is a 47 y.o. female patient admitted after midnight on 5/20/2023 as a transfer from [facility] for pneumonia and elevated troponin. She has a pMH significant for chronic combined HFrEF, nonobstructive coronary artery disease, chronic left bundle branch block, hypertension, KENDALL, obesity by BMI, and former tobacco abuse. For further and complete admitting details, please see admission H&P.    Hospital Course     Hospital Course  Esme Francois was admitted as outlined in above HPI.     She was admitted to the Telemetry floor for further monitoring, evaluation, and treatment. Patient had reported shortness of  breath which began 2-3 days prior to arrival. She also reported productive cough with occasional blood-streaked sputum. During episodes of cough, patient states that she had some palpitations. Denied any chest pain or chest pressure. CT of the chest from outside hospital revealed patchy infiltrates in the perihilar region, left upper lobe and left lower lobe that were suspicious for pneumonia. There was no evidence of pulmonary edema or PE. White count only mildly elevated at 12K. Procal and lactate negative. Placed on IV Rocephin and Doxycycline on admission for empiric pneumonia coverage. Patient clinically improved from pneumonia standpoint. De-escalated to oral antibiotic regimen on 5/21. Will complete 5 day antibiotic course with Omnicef and Doxycycline. She had no further episodes of hemoptysis after arrival to our facility. Incentive spirometry use was encouraged. Troponin T was 39 on arrival with -1 delta. EKG revealed NSR in the 90s with chronic L BBB. No evidence of acute ischemia. Continuous cardiac monitoring remained in place throughout hospitalization. Most recent transthoracic echocardiogram from 12/19/2020 was reviewed. Revealed LVEF 31-35% with severely decreased left ventricular systolic function. Septal wall motion was abnormal, consistent with a bundle branch block. Also noted mild mitral valve regurgitation and trivial pericardial effusion. Home medications were resumed for GDMT including aspirin, high-dose statin, beta blocker (Coreg), Jardiance, Entresto, and Digoxin. Stress test was completed. Revealed intermediate risk with indeterminate ECG stress. Noted large-sized infarct located in the inferior wall with mild angela infarct ischemia. Updated transthoracic echocardiogram revealed further decrease in EF to 21-25% with severely reduced left ventricular systolic function. Patient was previously referred for pacemaker/defibrillator by Cardiology, however she refused. She states that she still  would not be interested in a pacemaker. She had a LifeVest previously, but it was returned due to noncompliance. Ultimately underwent left heart cath on 5/22/2023 by Dr. Agarwal. Right radial artery utilized. She tolerated procedure well and had no post procedure complications. Heart cath findings include 20% stenosis of the RCA. Nonobstructive CAD. Recommended to continue medical management. Dr. Vogel (patient's primary Cardiologist) evaluated patient while here. He recommended initiation of Spironolactone. Previously did not tolerate due to vertigo-like symptoms. However, she received first dose of spironolactone on 5/22/23 following procedure. So far she denies any adverse effects or dizziness. She also continues to deny any chest pain, shortness of breath, or palpitations. She has no peripheral edema; no evidence of volume overload. Therefore, she was not placed on diuretic. Due to unremarkable cardiac cath findings, patient is considered stable for discharge today. Discussed the importance of continued follow-up with Cardiology in the outpatient setting. Also encouraged follow-up with PCP in 1-2 weeks for post hospital discharge evaluation. All prior home medications were continued. Prescription for spironolactone added. She will also need 4 more doses of Omnicef and Doxycycline to complete pneumonia treatment. Per Dr. Vogel, patient is now willing to wear LifeVest and he has placed order. Patient continues to refuse pacemaker/ICD placement although discussed with her the risk of sudden death without one, and improvement in EF/survival benefit that pacemaker/ICD would likely provide. Per Dr. Vogel, patient is considering. They will further discuss at outpatient follow-up appointment. Discussed with Néstor Prieto DO.     Discussed with ABDULLAHI Jones on day of discharge.     Telemetry on day of discharge was normal sinus rhythm 90s.     Oxygen saturation on the day of discharge was 95% on room  air.      Pertinent Laboratory and Radiology Results     Pertinent Test Results:          Results from last 7 days   Lab Units 05/23/23  0209 05/22/23  0208 05/21/23  0315 05/20/23  0218   WBC 10*3/mm3 10.79 12.60* 10.16 12.32*   HEMOGLOBIN g/dL 11.1* 11.5* 10.7* 11.2*   HEMATOCRIT % 35.8 36.9 34.5 35.9   PLATELETS 10*3/mm3 319 324 269 296   MCV fL 83.8 85.4 84.6 83.9   SODIUM mmol/L 134* 136 138 135*   POTASSIUM mmol/L 4.1 3.7 3.8 3.8   CHLORIDE mmol/L 101 102 104 100   CO2 mmol/L 24.8 24.5 23.0 23.7   BUN mg/dL 11 12 10 9   CREATININE mg/dL 0.59 0.69 0.63 0.66   GLUCOSE mg/dL 124* 117* 153* 94   CALCIUM mg/dL 8.7 8.9 8.7 8.8        Results from last 7 days   Lab Units 05/20/23  0421 05/20/23  0218   HSTROP T ng/L 38* 39*     Results from last 7 days   Lab Units 05/23/23  0209 05/22/23  0208 05/21/23  0315 05/20/23  0218   LACTATE mmol/L  --   --   --  0.8   WBC 10*3/mm3 10.79 12.60* 10.16 12.32*           Invalid input(s): PROT, LABALBU  Results from last 7 days   Lab Units 05/20/23  0912   CHOLESTEROL mg/dL 136   TRIGLYCERIDES mg/dL 127   HDL CHOL mg/dL 38*     Results from last 7 days   Lab Units 05/20/23  0218   HEMOGLOBIN A1C % 6.30*     Brief Urine Lab Results  (Last result in the past 365 days)      Color   Clarity   Blood   Leuk Est   Nitrite   Protein   CREAT   Urine HCG        05/22/23 0948               Negative                     Results for orders placed during the hospital encounter of 05/20/23    Adult Transthoracic Echo Complete W/ Cont if Necessary Per Protocol    Interpretation Summary  •  Left ventricular systolic function is severely decreased. Left ventricular ejection fraction appears to be 21 - 25%.  •  The left ventricular cavity is dilated.  •  There is no evidence of pericardial effusion      Pending Labs     Order Current Status    Blood Culture - Blood, Arm, Left Preliminary result    Blood Culture - Blood, Hand, Right Preliminary result             ----------------------------------------------------------------------------------------------------------------------  No radiology results for the last 30 days.      Microbiology Results (last 10 days)     Procedure Component Value - Date/Time    Mycoplasma Pneumoniae Antibody, IgM - Blood, [[id number]]  (Normal) Collected: 05/20/23 0912    Lab Status: Final result Specimen: Blood Updated: 05/20/23 0956     Mycoplasma pneumo IgM Negative    S. Pneumo Ag Urine or CSF - Urine, Urine, Clean Catch [[id number]]  (Normal) Collected: 05/20/23 0527    Lab Status: Final result Specimen: Urine, Clean Catch Updated: 05/20/23 1350     Strep Pneumo Ag Negative    Blood Culture - Blood, Hand, Right [[id number]]  (Normal) Collected: 05/20/23 0219    Lab Status: Preliminary result Specimen: Blood from Hand, Right Updated: 05/23/23 0230     Blood Culture No growth at 3 days    Blood Culture - Blood, Arm, Left [[id number]]  (Normal) Collected: 05/20/23 0202    Lab Status: Preliminary result Specimen: Blood from Arm, Left Updated: 05/23/23 0230     Blood Culture No growth at 3 days          Labs above have been reviewed on the day of discharge.  Radiology images from prior 30 days were reviewed prior to discharge as incorporated into this document.     Discharge Vitals and Physical Examination       Vital Signs  Temp:  [97.9 °F (36.6 °C)-98.4 °F (36.9 °C)] 97.9 °F (36.6 °C)  Heart Rate:  [67-86] 73  Resp:  [18-22] 20  BP: ()/(45-84) 136/72     PHYSICAL EXAMINATION:   Physical Exam  Vitals and nursing note reviewed.   Constitutional:       General: She is awake. She is not in acute distress.     Appearance: She is obese. She is not ill-appearing or diaphoretic.      Comments: Room air.   HENT:      Head: Normocephalic and atraumatic.      Mouth/Throat:      Mouth: Mucous membranes are moist.      Pharynx: Oropharynx is clear.   Eyes:      Extraocular Movements: Extraocular movements intact.      Pupils: Pupils are equal,  round, and reactive to light.   Cardiovascular:      Rate and Rhythm: Normal rate and regular rhythm.      Pulses: Normal pulses.           Dorsalis pedis pulses are 2+ on the right side and 2+ on the left side.      Heart sounds: Normal heart sounds. No murmur heard.    No friction rub.   Pulmonary:      Effort: Pulmonary effort is normal. No accessory muscle usage, respiratory distress or retractions.      Breath sounds: Normal breath sounds. No wheezing, rhonchi or rales.   Abdominal:      General: Bowel sounds are normal. There is no distension.      Palpations: Abdomen is soft.      Tenderness: There is no abdominal tenderness. There is no guarding.   Musculoskeletal:      Cervical back: Neck supple. No rigidity.      Right lower leg: No edema.      Left lower leg: No edema.   Skin:     General: Skin is warm and dry.      Capillary Refill: Capillary refill takes 2 to 3 seconds.      Coloration: Skin is pale.   Neurological:      Mental Status: She is alert and oriented to person, place, and time. Mental status is at baseline.      Cranial Nerves: No facial asymmetry.      Sensory: Sensation is intact. No sensory deficit.      Motor: No weakness or tremor.      Gait: Gait normal.   Psychiatric:         Attention and Perception: Attention and perception normal.         Mood and Affect: Mood normal.         Speech: Speech normal.         Behavior: Behavior normal. Behavior is cooperative.         Thought Content: Thought content normal.         Cognition and Memory: Cognition and memory normal.         Judgment: Judgment normal.       Discharge Disposition, Discharge Medications, and Discharge Appointments     Discharge Disposition: Home, Self-Care    Condition on Discharge: Stable    Discharge Medications:     Discharge Medications      New Medications      Instructions Start Date   cefdinir 300 MG capsule  Commonly known as: OMNICEF   300 mg, Oral, Every 12 Hours Scheduled      doxycycline 100 MG  capsule  Commonly known as: MONODOX   100 mg, Oral, Every 12 Hours Scheduled      predniSONE 20 MG tablet  Commonly known as: DELTASONE   40 mg, Oral, Daily With Breakfast      spironolactone 25 MG tablet  Commonly known as: ALDACTONE   Take 1/2 tablet by mouth once daily for 30 days.         Continue These Medications      Instructions Start Date   aspirin 81 MG chewable tablet   81 mg, Oral, Daily      atorvastatin 80 MG tablet  Commonly known as: LIPITOR   80 mg, Oral, Daily      carvedilol 6.25 MG tablet  Commonly known as: COREG   6.25 mg, Oral, 2 Times Daily With Meals      digoxin 125 MCG tablet  Commonly known as: LANOXIN   125 mcg, Oral, Daily Digoxin      Entresto 49-51 MG tablet  Generic drug: sacubitril-valsartan   1 tablet, Oral, 2 Times Daily      Farxiga 10 MG tablet  Generic drug: dapagliflozin Propanediol   Oral      FLUoxetine 20 MG capsule  Commonly known as: PROzac   20 mg, Oral, Daily      hydrOXYzine pamoate 25 MG capsule  Commonly known as: VISTARIL   25 mg, Oral, 2 Times Daily      hydrOXYzine pamoate 25 MG capsule  Commonly known as: VISTARIL   50 mg, Oral, Nightly      loratadine 10 MG tablet  Commonly known as: CLARITIN   10 mg, Oral, Daily      omeprazole 40 MG capsule  Commonly known as: priLOSEC   40 mg, Oral, Daily             Discharged medication regimen discussed with attending physician prior to discharge.     Discharge Diet: Regular, Cardiac    Dietary Orders (From admission, onward)     Start     Ordered    05/22/23 1211  Diet: Cardiac Diets; Healthy Heart (2-3 Na+); Texture: Regular Texture (IDDSI 7); Fluid Consistency: Thin (IDDSI 0)  Diet Effective Now        References:    Diet Order [place]   Question Answer Comment   Diets: Cardiac Diets    Cardiac Diet: Healthy Heart (2-3 Na+)    Texture: Regular Texture (IDDSI 7)    Fluid Consistency: Thin (IDDSI 0)        05/22/23 1210                Activity at Discharge: As tolerated.    Discharge Disposition:    Home or Self  Care    Follow-up Appointments:      Additional Instructions for the Follow-ups that You Need to Schedule     Discharge Follow-up with PCP   As directed       Currently Documented PCP:    Bobby Dooley DO    PCP Phone Number:    [phone]     Follow Up Details: 1-2 Week Post Hospital Discharge Follow Up         Discharge Follow-up with Specialty: Cardiology (Dr. Vogel); 1 Week   As directed      Specialty: Cardiology (Dr. Vogel)    Follow Up: 1 Week    Follow Up Details: Left heart cath revealed only 20% RCA stenosis; Echo with further decrease in EF to 21-25%. On GDMT. LifeVest Ordered by Cardiology. Patient refuses ICD.            Follow-up Information     Bobby Dooley DO .    Specialty: Family Medicine  Why: 1-2 Week Post Hospital Discharge Follow Up  Contact information:  [address]  [address]  [facility] [address] [phone]             Dave Vogel MD Follow up in 2 week(s).    Specialty: Cardiology  Contact information:  [address]  [address]  [facility] [address] [phone]                         Additional Instructions for the Follow-ups that You Need to Schedule     Discharge Follow-up with PCP   As directed       Currently Documented PCP:    Bobby Dooley DO    PCP Phone Number:    [phone]     Follow Up Details: 1-2 Week Post Hospital Discharge Follow Up         Discharge Follow-up with Specialty: Cardiology (Dr. Vogel); 1 Week   As directed      Specialty: Cardiology (Dr. Vogel)    Follow Up: 1 Week    Follow Up Details: Left heart cath revealed only 20% RCA stenosis; Echo with further decrease in EF to 21-25%. On GDMT. LifeVest Ordered by Cardiology. Patient refuses ICD.               Test Results Pending at Discharge:    Pending Labs     Order Current Status    Blood Culture - Blood, Arm, Left Preliminary result    Blood Culture - Blood, Hand, Right Preliminary result             LEONIE Luna   Hospital Medicine Team  05/23/23  08:47 EDT      Time:  Greater than 30 minutes spent on this discharge.  I spent 50 minutes on this discharge activity which included:  Face-to-face encounter with the patient, discussing plan with attending physician, reviewing the data in the system, coordination of the care with the nursing staff as well as consultations, documentation, and entering orders.

## 2023-05-23 NOTE — CASE MANAGEMENT/SOCIAL WORK
Discharge Planning Assessment  [facility]     Patient Name: Esme Francois  MRN: [id number]  Today's Date: 5/23/2023    Admit Date: 5/20/2023    Plan: This CM spoke with pt via phone to discuss discharge.  Pt reports from apartment with adult daughter Venessa; DME as follows Bi-PAP from [employer], and wrist BP cuff, denies further DME needs however, would like a plug in arm BP cuff, and reports needing supplies for Bi-PAP; denies HH or needs; sees Dr Bobby Dooley; utilizes [facility]; verified payor as [employer] by [employer]; transportation home to be provided by sister in-law; discharge was discussed by physician.  Per pt awaiting Zoll LV to be fitted at time of call.  This CM did notify [employer] rep Alee via , awaiting call back to confirm.  This CM did f/u with [facility] regarding supplies for Bi-PAP and spoke with Latonya whom relayed that pt could not get supplies d/t non-compliance as pt's must wear 70% of time to recieve supplies.  Latonya checking with respiratory to see what pt could do to establish complaincy so as to recieve supplies, awaiting call back at time of review.  Per pt request this CM also checked with Candy at [facility] regarding whether insurance covers arm BP with outlet adaptor as pt states she has a writs one that was given to her however, does not believe it's very accurate.  Per Candy they are not covered by insurance however, if pt is interested they can be purchased for $83.00.  This CM will update pt once pending information is recieved from [facility].   Discharge Needs Assessment     Row Name 05/23/23 0921       Living Environment    People in Home child(taryn), adult    Name(s) of People in Home donna Francois    Current Living Arrangements apartment    Potentially Unsafe Housing Conditions none    Primary Care Provided by self    Provides Primary Care For no one    Family Caregiver if Needed none    Quality of Family Relationships unable to assess    Able to Return to  Prior Arrangements yes       Resource/Environmental Concerns    Resource/Environmental Concerns none       Transition Planning    Patient/Family Anticipates Transition to home with family    Patient/Family Anticipated Services at Transition none    Transportation Anticipated family or friend will provide       Discharge Needs Assessment    Readmission Within the Last 30 Days no previous admission in last 30 days    Equipment Currently Used at Home bipap;bp cuff    Concerns to be Addressed no discharge needs identified    Anticipated Changes Related to Illness none    Equipment Needed After Discharge none               Discharge Plan     Row Name 05/23/23 0923       Plan    Plan This CM spoke with pt via phone to discuss discharge.  Pt reports from apartment with adult daughter Venessa; DME as follows Bi-PAP from [employer], and wrist BP cuff, denies further DME needs however, would like a plug in arm BP cuff, and reports needing supplies for Bi-PAP; denies HH or needs; sees Dr Bobby Dooley; [facility]; verified payor as [employer] by [employer]; transportation home to be provided by sister in-law; discharge was discussed by physician.  Per pt awaiting Zoll LV to be fitted at time of call.  This CM did notify [employer] rep Alee via VM, awaiting call back to confirm.  This CM did f/u with [employer] regarding supplies for Bi-PAP and spoke with Latoyna whom relayed that pt could not get supplies d/t non-compliance as pt's must wear 70% of time to recieve supplies.  Latonya checking with respiratory to see what pt could do to establish complaincy so as to recieve supplies, awaiting call back at time of review.  Per pt request this CM also checked with Candy at [facility] regarding whether insurance covers arm BP with outlet adaptor as pt states she has a writs one that was given to her however, does not believe it's very accurate.  Per Candy they are not covered by insurance however, if pt is interested they  can be purchased for $83.00.  This  will update pt once pending information is recieved from [employer].    Patient/Family in Agreement with Plan yes    Plan Comments 5/22- pt arrived to ED with c/o SOB, and cough after cleaning out an older condemned home; pt NPO for cardiac cath, per most recent attending's note, acquired pneumonia and indeterminate troponin elevation with evidence of further decreased EF and intermediate risk stress test; pt continued on PO Omnicef, doxy, and prednisone; 96% on RA at time of review.              Expected Discharge Date and Time     Expected Discharge Date Expected Discharge Time    May 23, 2023         Yoselin Canas

## 2023-05-23 NOTE — PROGRESS NOTES
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  On optimal GDMT for HFrEF.  Recommended LifeVest upon discharge.  Agreeable.  Ordered.  Also recommended CRT-D device with pacemaker and ICD for primary prevention of sudden death and improvement in EF and survival benefit for which she declined so far and now  is considering.  I advised her to follow-up in my clinic in 2 weeks with a decision.

## 2023-05-23 NOTE — PLAN OF CARE
Goal Outcome Evaluation: Patient has been pleasant. Compliant with care. Patient remains on room air, saturations remaining above 90%. Patient ambulates in room, steady gait noted. Patient currently resting in bedside chair.     Patient to be D/C on this date. IV removed. Telemetry monitor removed. Patient awaiting lifevest.

## 2023-05-23 NOTE — DISCHARGE INSTR - APPOINTMENTS
Follow up appointments:   Dr. Dooley on May 30th at 9:10  Dr. Vogel in cardiology at the [place] office on June 14th at 3:15

## 2023-05-23 NOTE — PAYOR COMM NOTE
"CONTACT: BRYAN RUIZ RN  UTILIZATION MANAGEMENT DEPT.  [facility]  [address] [place] [address]  PHONE: [phone]  FAX: [phone]    ATTENTION: PT STATUS CHANGED TO OBSERVATION/ DC ORDER FOR 5/23/23  ID# [id number]    Esme Hernandez (47 y.o. Female)     Date of Birth   1975    Social Security Number       Address   [address] [place], [place] [address] [facility] [address]    Home Phone   [phone]    MRN   [id number]       [facility]    Marital Status                               Admission Date   5/20/23    Admission Type   Urgent    Admitting Provider   Rose Marie Le DO    Attending Provider   Néstor Villa DO    Department, Room/Bed   [address], 3311/2S       Discharge Date       Discharge Disposition   Home or Self Care    Discharge Destination                               Attending Provider: Néstor Villa DO    Allergies: Keflex [Cephalexin], Tricor [Fenofibrate], Lemon, Metoprolol, Pork-derived Products, Adhesive Tape, Latex, Tylenol With Codeine #3 [Acetaminophen-codeine]    Isolation: None   Infection: None   Code Status: CPR    Ht: 165.1 cm (65\")   Wt: 119 kg (261 lb 12.8 oz)    Admission Cmt: None   Principal Problem: Pneumonia [J18.9]                 Active Insurance as of 5/20/2023     Primary Coverage     Payor Plan Insurance Group Employer/Plan Group    [facility] BY JANA [facility] BY JANA [id number]     Payor Plan Address Payor Plan Phone Number Payor Plan Fax Number Effective Dates    [address] [address]   1/1/2021 - None Entered    [facility] [id number]       Subscriber Name Subscriber Birth Date Member ID       ESME HERNANDEZ 1975 [id number]                 Emergency Contacts      (Rel.) Home Phone Work Phone Mobile Phone    Venessa Magallanes (Mother) -- -- [phone]               Discharge Summary      Coretta Vega APRN at 05/23/23 0816              [facility] " Medicine Services  DISCHARGE SUMMARY    Date of Admission: 5/20/2023    Date of Discharge:  5/23/2023    PCP: Bobby Dooley DO    Discharging Provider: LEONIE Luna  Attending Physician on day of DC: Néstor Prieto DO    Admission Diagnosis:   Please see admission H&P    Discharge Diagnosis:   · Sepsis due to left-sided community-acquired pneumonia  · Indeterminate troponin elevation, due to non-cardiac factors  · Nonobstructive coronary artery disease status post left cardiac catheterization which revealed 20% stenosis of RCA  · Chronic left bundle branch block  · Chronic HFrEF with significantly reduced left ventricular systolic function (EF 21-25%)  · Non-insulin dependent type II DM, HgbA1c 6.3%  · Morbid obesity with BMI 43.57 kg/m2  · Obstructive sleep apnea, compliant with CPAP nightly  · Former tobacco abuse  · Chronic normocytic anemia  · Anxiety/Depression  · GERD  · Reported history of seizures    Procedures Performed:  • Transthoracic echocardiogram  • Stress test with myocardial perfusion  • Left cardiac catheterization on 5/22/2023 by Dr. Agarwal     Consults:   Consults     Date and Time Order Name Status Description    5/21/2023 12:33 AM Inpatient Cardiology Consult Completed           HPI     History of Present Illness:  Esme Francois is a 47 y.o. female patient admitted after midnight on 5/20/2023 [facility] and elevated troponin. She has a pMH significant for chronic combined HFrEF, nonobstructive coronary artery disease, chronic left bundle branch block, hypertension, KENDALL, obesity by BMI, and former tobacco abuse. For further and complete admitting details, please see admission H&P.    Hospital Course     Hospital Course  Esme Francois was admitted as outlined in above HPI.     She was admitted to the Telemetry floor for further monitoring, evaluation, and treatment. Patient had reported shortness of breath which began 2-3 days prior to  arrival. She also reported productive cough with occasional blood-streaked sputum. During episodes of cough, patient states that she had some palpitations. Denied any chest pain or chest pressure. CT of the chest from outside hospital revealed patchy infiltrates in the perihilar region, left upper lobe and left lower lobe that were suspicious for pneumonia. There was no evidence of pulmonary edema or PE. White count only mildly elevated at 12K. Procal and lactate negative. Placed on IV Rocephin and Doxycycline on admission for empiric pneumonia coverage. Patient clinically improved from pneumonia standpoint. De-escalated to oral antibiotic regimen on 5/21. Will complete 5 day antibiotic course with Omnicef and Doxycycline. She had no further episodes of hemoptysis after arrival to our facility. Incentive spirometry use was encouraged. Troponin T was 39 on arrival with -1 delta. EKG revealed NSR in the 90s with chronic L BBB. No evidence of acute ischemia. Continuous cardiac monitoring remained in place throughout hospitalization. Most recent transthoracic echocardiogram from 12/19/2020 was reviewed. Revealed LVEF 31-35% with severely decreased left ventricular systolic function. Septal wall motion was abnormal, consistent with a bundle branch block. Also noted mild mitral valve regurgitation and trivial pericardial effusion. Home medications were resumed for GDMT including aspirin, high-dose statin, beta blocker (Coreg), Jardiance, Entresto, and Digoxin. Stress test was completed. Revealed intermediate risk with indeterminate ECG stress. Noted large-sized infarct located in the inferior wall with mild angela infarct ischemia. Updated transthoracic echocardiogram revealed further decrease in EF to 21-25% with severely reduced left ventricular systolic function. Patient was previously referred for pacemaker/defibrillator by Cardiology, however she refused. She states that she still would not be interested in a  pacemaker. She had a LifeVest previously, but it was returned due to noncompliance. Ultimately underwent left heart cath on 5/22/2023 by Dr. Agarwal. Right radial artery utilized. She tolerated procedure well and had no post procedure complications. Heart cath findings include 20% stenosis of the RCA. Nonobstructive CAD. Recommended to continue medical management. Dr. Vogel (patient's primary Cardiologist) evaluated patient while here. He recommended initiation of Spironolactone. Previously did not tolerate due to vertigo-like symptoms. However, she received first dose of spironolactone on 5/22/23 following procedure. So far she denies any adverse effects or dizziness. She also continues to deny any chest pain, shortness of breath, or palpitations. She has no peripheral edema; no evidence of volume overload. Therefore, she was not placed on diuretic. Due to unremarkable cardiac cath findings, patient is considered stable for discharge today. Discussed the importance of continued follow-up with Cardiology in the outpatient setting. Also encouraged follow-up with PCP in 1-2 weeks for post hospital discharge evaluation. All prior home medications were continued. Prescription for spironolactone added. She will also need 4 more doses of Omnicef and Doxycycline to complete pneumonia treatment. Per Dr. Vogel, patient is now willing to wear LifeVest and he has placed order. Patient continues to refuse pacemaker/ICD placement although discussed with her the risk of sudden death without one, and improvement in EF/survival benefit that pacemaker/ICD would likely provide. Per Dr. Vogel, patient is considering. They will further discuss at outpatient follow-up appointment. Discussed with Néstor Prieto DO.     Discussed with ABDULLAHI Jones on day of discharge.     Telemetry on day of discharge was normal sinus rhythm 90s.     Oxygen saturation on the day of discharge was 95% on room air.      Pertinent Laboratory  and Radiology Results     Pertinent Test Results:          Results from last 7 days   Lab Units 05/23/23  0209 05/22/23  0208 05/21/23  0315 05/20/23  0218   WBC 10*3/mm3 10.79 12.60* 10.16 12.32*   HEMOGLOBIN g/dL 11.1* 11.5* 10.7* 11.2*   HEMATOCRIT % 35.8 36.9 34.5 35.9   PLATELETS 10*3/mm3 319 324 269 296   MCV fL 83.8 85.4 84.6 83.9   SODIUM mmol/L 134* 136 138 135*   POTASSIUM mmol/L 4.1 3.7 3.8 3.8   CHLORIDE mmol/L 101 102 104 100   CO2 mmol/L 24.8 24.5 23.0 23.7   BUN mg/dL 11 12 10 9   CREATININE mg/dL 0.59 0.69 0.63 0.66   GLUCOSE mg/dL 124* 117* 153* 94   CALCIUM mg/dL 8.7 8.9 8.7 8.8        Results from last 7 days   Lab Units 05/20/23  0421 05/20/23  0218   HSTROP T ng/L 38* 39*     Results from last 7 days   Lab Units 05/23/23  0209 05/22/23  0208 05/21/23  0315 05/20/23  0218   LACTATE mmol/L  --   --   --  0.8   WBC 10*3/mm3 10.79 12.60* 10.16 12.32*           Invalid input(s): PROT, LABALBU  Results from last 7 days   Lab Units 05/20/23  0912   CHOLESTEROL mg/dL 136   TRIGLYCERIDES mg/dL 127   HDL CHOL mg/dL 38*     Results from last 7 days   Lab Units 05/20/23  0218   HEMOGLOBIN A1C % 6.30*     Brief Urine Lab Results  (Last result in the past 365 days)      Color   Clarity   Blood   Leuk Est   Nitrite   Protein   CREAT   Urine HCG        05/22/23 0948               Negative                     Results for orders placed during the hospital encounter of 05/20/23    Adult Transthoracic Echo Complete W/ Cont if Necessary Per Protocol    Interpretation Summary  •  Left ventricular systolic function is severely decreased. Left ventricular ejection fraction appears to be 21 - 25%.  •  The left ventricular cavity is dilated.  •  There is no evidence of pericardial effusion      Pending Labs     Order Current Status    Blood Culture - Blood, Arm, Left Preliminary result    Blood Culture - Blood, Hand, Right Preliminary result             ----------------------------------------------------------------------------------------------------------------------  No radiology results for the last 30 days.      Microbiology Results (last 10 days)     Procedure Component Value - Date/Time    Mycoplasma Pneumoniae Antibody, IgM - Blood, [[id number]]  (Normal) Collected: 05/20/23 0912    Lab Status: Final result Specimen: Blood Updated: 05/20/23 0956     Mycoplasma pneumo IgM Negative    S. Pneumo Ag Urine or CSF - Urine, Urine, Clean Catch [[id number]]  (Normal) Collected: 05/20/23 0527    Lab Status: Final result Specimen: Urine, Clean Catch Updated: 05/20/23 1350     Strep Pneumo Ag Negative    Blood Culture - Blood, Hand, Right [[id number]]  (Normal) Collected: 05/20/23 0219    Lab Status: Preliminary result Specimen: Blood from Hand, Right Updated: 05/23/23 0230     Blood Culture No growth at 3 days    Blood Culture - Blood, Arm, Left [[id number]]  (Normal) Collected: 05/20/23 0202    Lab Status: Preliminary result Specimen: Blood from Arm, Left Updated: 05/23/23 0230     Blood Culture No growth at 3 days          Labs above have been reviewed on the day of discharge.  Radiology images from prior 30 days were reviewed prior to discharge as incorporated into this document.     Discharge Vitals and Physical Examination       Vital Signs  Temp:  [97.9 °F (36.6 °C)-98.4 °F (36.9 °C)] 97.9 °F (36.6 °C)  Heart Rate:  [67-86] 73  Resp:  [18-22] 20  BP: ()/(45-84) 136/72     PHYSICAL EXAMINATION:   Physical Exam  Vitals and nursing note reviewed.   Constitutional:       General: She is awake. She is not in acute distress.     Appearance: She is obese. She is not ill-appearing or diaphoretic.      Comments: Room air.   HENT:      Head: Normocephalic and atraumatic.      Mouth/Throat:      Mouth: Mucous membranes are moist.      Pharynx: Oropharynx is clear.   Eyes:      Extraocular Movements: Extraocular movements intact.      Pupils: Pupils are equal,  round, and reactive to light.   Cardiovascular:      Rate and Rhythm: Normal rate and regular rhythm.      Pulses: Normal pulses.           Dorsalis pedis pulses are 2+ on the right side and 2+ on the left side.      Heart sounds: Normal heart sounds. No murmur heard.    No friction rub.   Pulmonary:      Effort: Pulmonary effort is normal. No accessory muscle usage, respiratory distress or retractions.      Breath sounds: Normal breath sounds. No wheezing, rhonchi or rales.   Abdominal:      General: Bowel sounds are normal. There is no distension.      Palpations: Abdomen is soft.      Tenderness: There is no abdominal tenderness. There is no guarding.   Musculoskeletal:      Cervical back: Neck supple. No rigidity.      Right lower leg: No edema.      Left lower leg: No edema.   Skin:     General: Skin is warm and dry.      Capillary Refill: Capillary refill takes 2 to 3 seconds.      Coloration: Skin is pale.   Neurological:      Mental Status: She is alert and oriented to person, place, and time. Mental status is at baseline.      Cranial Nerves: No facial asymmetry.      Sensory: Sensation is intact. No sensory deficit.      Motor: No weakness or tremor.      Gait: Gait normal.   Psychiatric:         Attention and Perception: Attention and perception normal.         Mood and Affect: Mood normal.         Speech: Speech normal.         Behavior: Behavior normal. Behavior is cooperative.         Thought Content: Thought content normal.         Cognition and Memory: Cognition and memory normal.         Judgment: Judgment normal.       Discharge Disposition, Discharge Medications, and Discharge Appointments     Discharge Disposition: Home, Self-Care    Condition on Discharge: Stable    Discharge Medications:     Discharge Medications      New Medications      Instructions Start Date   cefdinir 300 MG capsule  Commonly known as: OMNICEF   300 mg, Oral, Every 12 Hours Scheduled      doxycycline 100 MG  capsule  Commonly known as: MONODOX   100 mg, Oral, Every 12 Hours Scheduled      predniSONE 20 MG tablet  Commonly known as: DELTASONE   40 mg, Oral, Daily With Breakfast      spironolactone 25 MG tablet  Commonly known as: ALDACTONE   Take 1/2 tablet by mouth once daily for 30 days.         Continue These Medications      Instructions Start Date   aspirin 81 MG chewable tablet   81 mg, Oral, Daily      atorvastatin 80 MG tablet  Commonly known as: LIPITOR   80 mg, Oral, Daily      carvedilol 6.25 MG tablet  Commonly known as: COREG   6.25 mg, Oral, 2 Times Daily With Meals      digoxin 125 MCG tablet  Commonly known as: LANOXIN   125 mcg, Oral, Daily Digoxin      Entresto 49-51 MG tablet  Generic drug: sacubitril-valsartan   1 tablet, Oral, 2 Times Daily      Farxiga 10 MG tablet  Generic drug: dapagliflozin Propanediol   Oral      FLUoxetine 20 MG capsule  Commonly known as: PROzac   20 mg, Oral, Daily      hydrOXYzine pamoate 25 MG capsule  Commonly known as: VISTARIL   25 mg, Oral, 2 Times Daily      hydrOXYzine pamoate 25 MG capsule  Commonly known as: VISTARIL   50 mg, Oral, Nightly      loratadine 10 MG tablet  Commonly known as: CLARITIN   10 mg, Oral, Daily      omeprazole 40 MG capsule  Commonly known as: priLOSEC   40 mg, Oral, Daily             Discharged medication regimen discussed with attending physician prior to discharge.     Discharge Diet: Regular, Cardiac    Dietary Orders (From admission, onward)     Start     Ordered    05/22/23 1211  Diet: Cardiac Diets; Healthy Heart (2-3 Na+); Texture: Regular Texture (IDDSI 7); Fluid Consistency: Thin (IDDSI 0)  Diet Effective Now        References:    Diet Order [facility]   Question Answer Comment   Diets: Cardiac Diets    Cardiac Diet: Healthy Heart (2-3 Na+)    Texture: Regular Texture (IDDSI 7)    Fluid Consistency: Thin (IDDSI 0)        05/22/23 1210                Activity at Discharge: As tolerated.    Discharge Disposition:    Home or Self  Care    Follow-up Appointments:      Additional Instructions for the Follow-ups that You Need to Schedule     Discharge Follow-up with PCP   As directed       Currently Documented PCP:    Bobby Dooley DO    PCP Phone Number:    [phone]     Follow Up Details: 1-2 Week Post Hospital Discharge Follow Up         Discharge Follow-up with Specialty: Cardiology (Dr. Vogel); 1 Week   As directed      Specialty: Cardiology (Dr. Vogel)    Follow Up: 1 Week    Follow Up Details: Left heart cath revealed only 20% RCA stenosis; Echo with further decrease in EF to 21-25%. On GDMT. LifeVest Ordered by Cardiology. Patient refuses ICD.            Follow-up Information     Bobby Dooley DO .    Specialty: Family Medicine  Why: 1-2 Week Post Hospital Discharge Follow Up  Contact information:  [address]  [address]  [facility] [address] [phone]             Dave Vogel MD Follow up in 2 week(s).    Specialty: Cardiology  Contact information:  [address] [facility]  [address]  [facility] [address] [phone]                         Additional Instructions for the Follow-ups that You Need to Schedule     Discharge Follow-up with PCP   As directed       Currently Documented PCP:    Bobby Dooley DO    PCP Phone Number:    [phone]     Follow Up Details: 1-2 Week Post Hospital Discharge Follow Up         Discharge Follow-up with Specialty: Cardiology (Dr. Vogel); 1 Week   As directed      Specialty: Cardiology (Dr. Vogel)    Follow Up: 1 Week    Follow Up Details: Left heart cath revealed only 20% RCA stenosis; Echo with further decrease in EF to 21-25%. On GDMT. LifeVest Ordered by Cardiology. Patient refuses ICD.               Test Results Pending at Discharge:    Pending Labs     Order Current Status    Blood Culture - Blood, Arm, Left Preliminary result    Blood Culture - Blood, Hand, Right Preliminary result             LEONIE Luna   Hospital Medicine Team  05/23/23  08:47 EDT      Time:  Greater than 30 minutes spent on this discharge.  I spent 50 minutes on this discharge activity which included:  Face-to-face encounter with the patient, discussing plan with attending physician, reviewing the data in the system, coordination of the care with the nursing staff as well as consultations, documentation, and entering orders.     Electronically signed by Coretta Vega APRN at 05/23/23 9660

## 2023-05-23 NOTE — CASE MANAGEMENT/SOCIAL WORK
This CM notified Alee with [facility] that pt did decide to discharge home prior to being fitted with LifeVest; Alee will f/u with pt and schedule home fitting.

## 2023-05-24 ENCOUNTER — READMISSION MANAGEMENT (OUTPATIENT)
Dept: CALL CENTER | Facility: HOSPITAL | Age: 48
End: 2023-05-24
Payer: COMMERCIAL

## 2023-05-24 NOTE — OUTREACH NOTE
Prep Survey      Flowsheet Row Responses   [facility] facility patient discharged from? [facility]   Is LACE score < 7 ? Yes   Eligibility Readm Mgmt   Discharge diagnosis Pneumonia   Does the patient have one of the following disease processes/diagnoses(primary or secondary)? Pneumonia   Does the patient have Home health ordered? No   Is there a DME ordered? No   Prep survey completed? Yes            Mar GARCIA - Registered Nurse

## 2023-05-25 LAB
BACTERIA SPEC AEROBE CULT: NORMAL
BACTERIA SPEC AEROBE CULT: NORMAL

## 2023-11-17 ENCOUNTER — TRANSCRIBE ORDERS (OUTPATIENT)
Dept: ADMINISTRATIVE | Facility: HOSPITAL | Age: 48
End: 2023-11-17
Payer: COMMERCIAL

## 2023-11-17 DIAGNOSIS — I51.3 LEFT VENTRICULAR THROMBUS: Primary | ICD-10-CM

## 2023-11-29 ENCOUNTER — HOSPITAL ENCOUNTER (OUTPATIENT)
Facility: HOSPITAL | Age: 48
Discharge: HOME | End: 2023-11-29
Payer: COMMERCIAL

## 2023-11-29 DIAGNOSIS — I51.3 LEFT VENTRICULAR THROMBUS: ICD-10-CM

## 2023-11-29 LAB
BH CV ECHO MEAS - EDV(CUBED): 527.5 ML
BH CV ECHO MEAS - EDV(MOD-SP4): 219.7 ML
BH CV ECHO MEAS - EF(MOD-SP4): 43.4 %
BH CV ECHO MEAS - ESV(CUBED): 216 ML
BH CV ECHO MEAS - ESV(MOD-SP4): 124.4 ML
BH CV ECHO MEAS - FS: 25.7 %
BH CV ECHO MEAS - IVS/LVPW: 1 CM
BH CV ECHO MEAS - IVSD: 1.19 CM
BH CV ECHO MEAS - LV DIASTOLIC VOL/BSA (35-75): 99.3 CM2
BH CV ECHO MEAS - LV MASS(C)D: 512.3 GRAMS
BH CV ECHO MEAS - LV SYSTOLIC VOL/BSA (12-30): 56.3 CM2
BH CV ECHO MEAS - LVIDD: 8.1 CM
BH CV ECHO MEAS - LVIDS: 6 CM
BH CV ECHO MEAS - LVPWD: 1.19 CM
BH CV ECHO MEAS - SI(MOD-SP4): 43.1 ML/M2
BH CV ECHO MEAS - SV(MOD-SP4): 95.3 ML

## 2023-11-29 PROCEDURE — 93308 TTE F-UP OR LMTD: CPT

## 2023-12-05 LAB
BH CV ECHO MEAS - EDV(CUBED): 527.5 ML
BH CV ECHO MEAS - EDV(MOD-SP4): 219.7 ML
BH CV ECHO MEAS - EF(MOD-SP4): 43.4 %
BH CV ECHO MEAS - ESV(CUBED): 216 ML
BH CV ECHO MEAS - ESV(MOD-SP4): 124.4 ML
BH CV ECHO MEAS - FS: 25.7 %
BH CV ECHO MEAS - IVS/LVPW: 1 CM
BH CV ECHO MEAS - IVSD: 1.19 CM
BH CV ECHO MEAS - LV DIASTOLIC VOL/BSA (35-75): 99.3 CM2
BH CV ECHO MEAS - LV MASS(C)D: 512.3 GRAMS
BH CV ECHO MEAS - LV SYSTOLIC VOL/BSA (12-30): 56.3 CM2
BH CV ECHO MEAS - LVIDD: 8.1 CM
BH CV ECHO MEAS - LVIDS: 6 CM
BH CV ECHO MEAS - LVPWD: 1.19 CM
BH CV ECHO MEAS - SI(MOD-SP4): 43.1 ML/M2
BH CV ECHO MEAS - SV(MOD-SP4): 95.3 ML
LV EF 2D ECHO EST: 25 %

## 2024-09-27 ENCOUNTER — APPOINTMENT (OUTPATIENT)
Dept: GENERAL RADIOLOGY | Facility: HOSPITAL | Age: 49
End: 2024-09-27
Payer: COMMERCIAL

## 2024-09-27 ENCOUNTER — HOSPITAL ENCOUNTER (EMERGENCY)
Facility: HOSPITAL | Age: 49
Discharge: HOME | End: 2024-09-27
Payer: COMMERCIAL

## 2024-09-27 VITALS
OXYGEN SATURATION: 97 % | HEIGHT: 65 IN | WEIGHT: 225 LBS | SYSTOLIC BLOOD PRESSURE: 116 MMHG | HEART RATE: 76 BPM | DIASTOLIC BLOOD PRESSURE: 72 MMHG | RESPIRATION RATE: 14 BRPM | TEMPERATURE: 97.3 F | BODY MASS INDEX: 37.49 KG/M2

## 2024-09-27 DIAGNOSIS — R00.2 PALPITATIONS: Primary | ICD-10-CM

## 2024-09-27 LAB
ALBUMIN SERPL-MCNC: 3.6 G/DL (ref 3.5–5.2)
ALBUMIN/GLOB SERPL: 1.4 G/DL
ALP SERPL-CCNC: 95 U/L (ref 39–117)
ALT SERPL W P-5'-P-CCNC: 10 U/L (ref 1–33)
AMPHET+METHAMPHET UR QL: NEGATIVE
AMPHETAMINES UR QL: NEGATIVE
ANION GAP SERPL CALCULATED.3IONS-SCNC: 13.4 MMOL/L (ref 5–15)
AST SERPL-CCNC: 13 U/L (ref 1–32)
BARBITURATES UR QL SCN: NEGATIVE
BASOPHILS # BLD AUTO: 0.05 10*3/MM3 (ref 0–0.2)
BASOPHILS NFR BLD AUTO: 0.6 % (ref 0–1.5)
BENZODIAZ UR QL SCN: NEGATIVE
BILIRUB SERPL-MCNC: <0.2 MG/DL (ref 0–1.2)
BUN SERPL-MCNC: 12 MG/DL (ref 6–20)
BUN/CREAT SERPL: 17.9 (ref 7–25)
BUPRENORPHINE SERPL-MCNC: NEGATIVE NG/ML
CALCIUM SPEC-SCNC: 9.1 MG/DL (ref 8.6–10.5)
CANNABINOIDS SERPL QL: NEGATIVE
CHLORIDE SERPL-SCNC: 104 MMOL/L (ref 98–107)
CO2 SERPL-SCNC: 18.6 MMOL/L (ref 22–29)
COCAINE UR QL: NEGATIVE
CREAT SERPL-MCNC: 0.67 MG/DL (ref 0.57–1)
DEPRECATED RDW RBC AUTO: 48.3 FL (ref 37–54)
DIGOXIN SERPL-MCNC: 0.6 NG/ML (ref 0.6–1.2)
EGFRCR SERPLBLD CKD-EPI 2021: 108 ML/MIN/1.73
EOSINOPHIL # BLD AUTO: 0.5 10*3/MM3 (ref 0–0.4)
EOSINOPHIL NFR BLD AUTO: 6.2 % (ref 0.3–6.2)
ERYTHROCYTE [DISTWIDTH] IN BLOOD BY AUTOMATED COUNT: 14.8 % (ref 12.3–15.4)
FENTANYL UR-MCNC: NEGATIVE NG/ML
GEN 5 2HR TROPONIN T REFLEX: 7 NG/L
GLOBULIN UR ELPH-MCNC: 2.6 GM/DL
GLUCOSE SERPL-MCNC: 112 MG/DL (ref 65–99)
HCT VFR BLD AUTO: 38.1 % (ref 34–46.6)
HGB BLD-MCNC: 11.4 G/DL (ref 12–15.9)
HOLD SPECIMEN: NORMAL
HOLD SPECIMEN: NORMAL
IMM GRANULOCYTES # BLD AUTO: 0.03 10*3/MM3 (ref 0–0.05)
IMM GRANULOCYTES NFR BLD AUTO: 0.4 % (ref 0–0.5)
LYMPHOCYTES # BLD AUTO: 3.03 10*3/MM3 (ref 0.7–3.1)
LYMPHOCYTES NFR BLD AUTO: 37.5 % (ref 19.6–45.3)
MAGNESIUM SERPL-MCNC: 2.2 MG/DL (ref 1.6–2.6)
MCH RBC QN AUTO: 26.6 PG (ref 26.6–33)
MCHC RBC AUTO-ENTMCNC: 29.9 G/DL (ref 31.5–35.7)
MCV RBC AUTO: 89 FL (ref 79–97)
METHADONE UR QL SCN: NEGATIVE
MONOCYTES # BLD AUTO: 0.41 10*3/MM3 (ref 0.1–0.9)
MONOCYTES NFR BLD AUTO: 5.1 % (ref 5–12)
NEUTROPHILS NFR BLD AUTO: 4.06 10*3/MM3 (ref 1.7–7)
NEUTROPHILS NFR BLD AUTO: 50.2 % (ref 42.7–76)
NRBC BLD AUTO-RTO: 0 /100 WBC (ref 0–0.2)
OPIATES UR QL: NEGATIVE
OXYCODONE UR QL SCN: NEGATIVE
PCP UR QL SCN: NEGATIVE
PLATELET # BLD AUTO: 253 10*3/MM3 (ref 140–450)
PMV BLD AUTO: 10.1 FL (ref 6–12)
POTASSIUM SERPL-SCNC: 4.4 MMOL/L (ref 3.5–5.2)
PROT SERPL-MCNC: 6.2 G/DL (ref 6–8.5)
QT INTERVAL: 448 MS
QT INTERVAL: 480 MS
QTC INTERVAL: 497 MS
QTC INTERVAL: 507 MS
RBC # BLD AUTO: 4.28 10*6/MM3 (ref 3.77–5.28)
SODIUM SERPL-SCNC: 136 MMOL/L (ref 136–145)
TRICYCLICS UR QL SCN: NEGATIVE
TROPONIN T DELTA: NORMAL
TROPONIN T SERPL HS-MCNC: <6 NG/L
TSH SERPL DL<=0.05 MIU/L-ACNC: 1.52 UIU/ML (ref 0.27–4.2)
WBC NRBC COR # BLD AUTO: 8.08 10*3/MM3 (ref 3.4–10.8)
WHOLE BLOOD HOLD COAG: NORMAL
WHOLE BLOOD HOLD SPECIMEN: NORMAL

## 2024-09-27 PROCEDURE — 36415 COLL VENOUS BLD VENIPUNCTURE: CPT

## 2024-09-27 PROCEDURE — 83735 ASSAY OF MAGNESIUM: CPT | Performed by: PHYSICIAN ASSISTANT

## 2024-09-27 PROCEDURE — 93005 ELECTROCARDIOGRAM TRACING: CPT

## 2024-09-27 PROCEDURE — 80053 COMPREHEN METABOLIC PANEL: CPT

## 2024-09-27 PROCEDURE — 84443 ASSAY THYROID STIM HORMONE: CPT | Performed by: PHYSICIAN ASSISTANT

## 2024-09-27 PROCEDURE — 93005 ELECTROCARDIOGRAM TRACING: CPT | Performed by: FAMILY MEDICINE

## 2024-09-27 PROCEDURE — 93010 ELECTROCARDIOGRAM REPORT: CPT | Performed by: INTERNAL MEDICINE

## 2024-09-27 PROCEDURE — 85025 COMPLETE CBC W/AUTO DIFF WBC: CPT

## 2024-09-27 PROCEDURE — 71045 X-RAY EXAM CHEST 1 VIEW: CPT

## 2024-09-27 PROCEDURE — 71045 X-RAY EXAM CHEST 1 VIEW: CPT | Performed by: RADIOLOGY

## 2024-09-27 PROCEDURE — 99284 EMERGENCY DEPT VISIT MOD MDM: CPT

## 2024-09-27 PROCEDURE — 80307 DRUG TEST PRSMV CHEM ANLYZR: CPT | Performed by: PHYSICIAN ASSISTANT

## 2024-09-27 PROCEDURE — 84484 ASSAY OF TROPONIN QUANT: CPT

## 2024-09-27 PROCEDURE — 80162 ASSAY OF DIGOXIN TOTAL: CPT | Performed by: PHYSICIAN ASSISTANT

## 2024-09-27 RX ORDER — SODIUM CHLORIDE 0.9 % (FLUSH) 0.9 %
10 SYRINGE (ML) INJECTION AS NEEDED
Status: DISCONTINUED | OUTPATIENT
Start: 2024-09-27 | End: 2024-09-27 | Stop reason: HOSPADM

## 2024-09-27 NOTE — ED PROVIDER NOTES
"Subjective   History of Present Illness  48-year-old female with history of CAD, left bundle branch block, and seizures presenting with concerns of palpitations.  Symptoms started this morning and have been consistent.  She states she feels as if her heart is taking extra beats.  She has no associated chest pain or shortness of breath.  There is no other associated symptoms no fever no chills no nausea no vomiting.  She says that she was told in the past that if she started feeling anything with her heart she should always come get checked out.      Review of Systems   Constitutional: Negative.  Negative for fever.   HENT: Negative.     Respiratory: Negative.  Negative for apnea, cough, choking, chest tightness, shortness of breath, wheezing and stridor.    Cardiovascular:  Positive for palpitations. Negative for chest pain and leg swelling.   Gastrointestinal: Negative.  Negative for abdominal pain.   Endocrine: Negative.    Genitourinary: Negative.  Negative for dysuria.   Skin: Negative.    Neurological: Negative.  Negative for dizziness, tremors, seizures, syncope, facial asymmetry, speech difficulty, weakness, light-headedness, numbness and headaches.   Psychiatric/Behavioral: Negative.     All other systems reviewed and are negative.      Past Medical History:   Diagnosis Date    CAD (coronary artery disease) 12/19/2020    Non obstructive    Depression     GERD (gastroesophageal reflux disease)     Hyperlipidemia     Hypertension     Ischemic cardiomyopathy 12/19/2020    LBBB (left bundle branch block)     KENDALL (obstructive sleep apnea)     CPAP    Seizures     last sz was one month ago       Allergies   Allergen Reactions    Keflex [Cephalexin] Hives     Blisters    Tricor [Fenofibrate] Other (See Comments)     \"Paralyzes\" patient.     Lemon Other (See Comments)     dont    Metoprolol Unknown - Low Severity     Pt states her doctor took her off her medications    Pork-Derived Products Other (See Comments)     " Patient does not eat    Adhesive Tape Rash    Latex Rash    Tylenol With Codeine #3 [Acetaminophen-Codeine] Hives and Itching       Past Surgical History:   Procedure Laterality Date    APPENDECTOMY  2014    CARDIAC CATHETERIZATION      CARDIAC CATHETERIZATION N/A 5/22/2023    Procedure: Left Heart Cath;  Surgeon: Amita Agarwal MD;  Location:  [facility];  Service: Cardiology;  Laterality: N/A;    CHOLECYSTECTOMY  1996    TUBAL ABDOMINAL LIGATION  2003       Family History   Problem Relation Age of Onset    Diabetes Mother     Hypertension Mother     Breast cancer Mother 53    Heart attack Father         x2    Hypertension Father     Asthma Brother     Depression Brother     Scoliosis Brother     Aneurysm Maternal Grandmother         brain    No Known Problems Paternal Grandmother     Cancer Maternal Aunt         thyroid cancer    No Known Problems Maternal Grandfather     No Known Problems Paternal Grandfather     Cancer Maternal Uncle         colon cancer       Social History     Socioeconomic History    Marital status:    Tobacco Use    Smoking status: Former     Current packs/day: 1.00     Average packs/day: 1 pack/day for 18.0 years (18.0 ttl pk-yrs)     Types: Cigarettes    Smokeless tobacco: Never    Tobacco comments:     sometimes more   Vaping Use    Vaping status: Never Used   Substance and Sexual Activity    Alcohol use: Yes     Comment: on occassion     Drug use: Not Currently     Comment: every now and then    Sexual activity: Yes     Partners: Female           Objective   Physical Exam  Vitals and nursing note reviewed.   Constitutional:       General: She is not in acute distress.     Appearance: She is well-developed. She is not diaphoretic.   HENT:      Head: Normocephalic and atraumatic.      Right Ear: External ear normal.      Left Ear: External ear normal.      Nose: Nose normal.   Eyes:      Conjunctiva/sclera: Conjunctivae normal.      Pupils: Pupils are equal,  round, and reactive to light.   Neck:      Vascular: No JVD.      Trachea: No tracheal deviation.   Cardiovascular:      Rate and Rhythm: Normal rate and regular rhythm.      Heart sounds: Normal heart sounds. No murmur heard.  Pulmonary:      Effort: Pulmonary effort is normal. No respiratory distress.      Breath sounds: Normal breath sounds. No wheezing.   Abdominal:      General: Bowel sounds are normal.      Palpations: Abdomen is soft.      Tenderness: There is no abdominal tenderness.   Musculoskeletal:         General: No deformity. Normal range of motion.      Cervical back: Normal range of motion and neck supple.   Skin:     General: Skin is warm and dry.      Coloration: Skin is not pale.      Findings: No erythema or rash.   Neurological:      Mental Status: She is alert and oriented to person, place, and time.      Cranial Nerves: No cranial nerve deficit.   Psychiatric:         Behavior: Behavior normal.         Thought Content: Thought content normal.         Procedures           ED Course  ED Course as of 09/27/24 1909   Fri Sep 27, 2024   1329 EKG normal sinus rhythm at rate 74 parable 180  QTc 497 left bundle branch block.  EKG is similar to EKG from May 2023. Electronically signed by Asif Buenrostro MD, 09/27/24, 1:30 PM EDT.   [BB]   9936 Repeat EKG: Normal sinus rhythm 67 bpm QTc is prolonged at 507 there is a left axis deviation left bundle branch block consistent with prior EKGs.  Electronically signed by Jhon Baez DO, 09/27/24, 5:49 PM EDT.   [GF]      ED Course User Index  [BB] Asif Buenrostro MD  [GF] Jhon Baez DO                                             Medical Decision Making  Troponins negative x 2 patient's EKG is consistent with a left bundle branch block which she has had in the past.  Her vitals have all been stable throughout her ED stay.  There is no acute complaints of chest pain or shortness of breath just continued feelings of palpitations.   Repeat EKG was also unremarkable for any acute changes.  We discussed the findings of the labs today.  All questions were answered.  Patient given ED precautions and to return if her symptoms worsen.  We also discussed her extensive medication regimen and that she needs to follow-up with her specialist to optimize her medication management.  Very possible that her symptoms are related to medications.     Problems Addressed:  Palpitations: complicated acute illness or injury    Amount and/or Complexity of Data Reviewed  Labs: ordered.  Radiology: ordered.  ECG/medicine tests: ordered.    Risk  Prescription drug management.        Final diagnoses:   Palpitations       ED Disposition  ED Disposition       ED Disposition   Discharge    Condition   Stable    Comment   --               Bobby Dooley,   [address] [address]  [phone]    Schedule an appointment as soon as possible for a visit in 1 week           Medication List      No changes were made to your prescriptions during this visit.            Jhon Baez,   09/27/24 [id number]

## 2024-09-27 NOTE — DISCHARGE INSTRUCTIONS
I am unsure what is causing her symptoms at this time.  However given your extensive medications that you take I do recommend you follow-up with your cardiologist and your primary care physician to adjust your medication treatment regimen.  A lot of the medications that you take can contribute to the feeling of palpitations.  If your symptoms acutely worsen do not hesitate to return to the ER.

## 2025-08-09 ENCOUNTER — HOSPITAL ENCOUNTER (EMERGENCY)
Facility: HOSPITAL | Age: 50
Discharge: HOME | End: 2025-08-09
Payer: COMMERCIAL

## 2025-08-09 ENCOUNTER — APPOINTMENT (OUTPATIENT)
Dept: GENERAL RADIOLOGY | Facility: HOSPITAL | Age: 50
End: 2025-08-09
Payer: COMMERCIAL

## 2025-08-09 ENCOUNTER — APPOINTMENT (OUTPATIENT)
Dept: CT IMAGING | Facility: HOSPITAL | Age: 50
End: 2025-08-09
Payer: COMMERCIAL

## 2025-08-09 VITALS
BODY MASS INDEX: 39.99 KG/M2 | SYSTOLIC BLOOD PRESSURE: 114 MMHG | OXYGEN SATURATION: 98 % | HEART RATE: 74 BPM | HEIGHT: 65 IN | RESPIRATION RATE: 17 BRPM | WEIGHT: 240 LBS | DIASTOLIC BLOOD PRESSURE: 68 MMHG | TEMPERATURE: 98.1 F

## 2025-08-09 DIAGNOSIS — I25.9 CHEST PAIN DUE TO MYOCARDIAL ISCHEMIA, UNSPECIFIED ISCHEMIC CHEST PAIN TYPE: Primary | ICD-10-CM

## 2025-08-09 LAB
ALBUMIN SERPL-MCNC: 3.8 G/DL (ref 3.5–5.2)
ALBUMIN/GLOB SERPL: 1.7 G/DL
ALP SERPL-CCNC: 97 U/L (ref 39–117)
ALT SERPL W P-5'-P-CCNC: 16 U/L (ref 1–33)
ANION GAP SERPL CALCULATED.3IONS-SCNC: 11.8 MMOL/L (ref 5–15)
AST SERPL-CCNC: 17 U/L (ref 1–32)
BASOPHILS # BLD AUTO: 0.03 10*3/MM3 (ref 0–0.2)
BASOPHILS NFR BLD AUTO: 0.5 % (ref 0–1.5)
BILIRUB SERPL-MCNC: 0.2 MG/DL (ref 0–1.2)
BUN SERPL-MCNC: 10.9 MG/DL (ref 6–20)
BUN/CREAT SERPL: 17.3 (ref 7–25)
CALCIUM SPEC-SCNC: 8.9 MG/DL (ref 8.6–10.5)
CHLORIDE SERPL-SCNC: 104 MMOL/L (ref 98–107)
CO2 SERPL-SCNC: 22.2 MMOL/L (ref 22–29)
CREAT SERPL-MCNC: 0.63 MG/DL (ref 0.57–1)
DEPRECATED RDW RBC AUTO: 46.8 FL (ref 37–54)
DIGOXIN SERPL-MCNC: 0.41 NG/ML (ref 0.6–1.2)
EGFRCR SERPLBLD CKD-EPI 2021: 108.9 ML/MIN/1.73
EOSINOPHIL # BLD AUTO: 0.47 10*3/MM3 (ref 0–0.4)
EOSINOPHIL NFR BLD AUTO: 7.4 % (ref 0.3–6.2)
ERYTHROCYTE [DISTWIDTH] IN BLOOD BY AUTOMATED COUNT: 15.3 % (ref 12.3–15.4)
GEN 5 1HR TROPONIN T REFLEX: 12 NG/L
GLOBULIN UR ELPH-MCNC: 2.2 GM/DL
GLUCOSE SERPL-MCNC: 118 MG/DL (ref 65–99)
HCT VFR BLD AUTO: 35.1 % (ref 34–46.6)
HGB BLD-MCNC: 11 G/DL (ref 12–15.9)
HOLD SPECIMEN: NORMAL
HOLD SPECIMEN: NORMAL
IMM GRANULOCYTES # BLD AUTO: 0.03 10*3/MM3 (ref 0–0.05)
IMM GRANULOCYTES NFR BLD AUTO: 0.5 % (ref 0–0.5)
LYMPHOCYTES # BLD AUTO: 2.33 10*3/MM3 (ref 0.7–3.1)
LYMPHOCYTES NFR BLD AUTO: 36.8 % (ref 19.6–45.3)
MCH RBC QN AUTO: 26.5 PG (ref 26.6–33)
MCHC RBC AUTO-ENTMCNC: 31.3 G/DL (ref 31.5–35.7)
MCV RBC AUTO: 84.6 FL (ref 79–97)
MONOCYTES # BLD AUTO: 0.53 10*3/MM3 (ref 0.1–0.9)
MONOCYTES NFR BLD AUTO: 8.4 % (ref 5–12)
NEUTROPHILS NFR BLD AUTO: 2.95 10*3/MM3 (ref 1.7–7)
NEUTROPHILS NFR BLD AUTO: 46.4 % (ref 42.7–76)
NRBC BLD AUTO-RTO: 0 /100 WBC (ref 0–0.2)
PLATELET # BLD AUTO: 264 10*3/MM3 (ref 140–450)
PMV BLD AUTO: 10.2 FL (ref 6–12)
POTASSIUM SERPL-SCNC: 3.5 MMOL/L (ref 3.5–5.2)
PROT SERPL-MCNC: 6 G/DL (ref 6–8.5)
QT INTERVAL: 478 MS
QTC INTERVAL: 530 MS
RBC # BLD AUTO: 4.15 10*6/MM3 (ref 3.77–5.28)
SODIUM SERPL-SCNC: 138 MMOL/L (ref 136–145)
TROPONIN T % DELTA: -43
TROPONIN T NUMERIC DELTA: -9 NG/L
TROPONIN T SERPL HS-MCNC: 11 NG/L
TROPONIN T SERPL HS-MCNC: 21 NG/L
WBC NRBC COR # BLD AUTO: 6.34 10*3/MM3 (ref 3.4–10.8)
WHOLE BLOOD HOLD COAG: NORMAL
WHOLE BLOOD HOLD SPECIMEN: NORMAL

## 2025-08-09 PROCEDURE — 25510000001 IOPAMIDOL PER 1 ML

## 2025-08-09 PROCEDURE — 36415 COLL VENOUS BLD VENIPUNCTURE: CPT

## 2025-08-09 PROCEDURE — 71275 CT ANGIOGRAPHY CHEST: CPT | Performed by: RADIOLOGY

## 2025-08-09 PROCEDURE — 71045 X-RAY EXAM CHEST 1 VIEW: CPT

## 2025-08-09 PROCEDURE — 93005 ELECTROCARDIOGRAM TRACING: CPT

## 2025-08-09 PROCEDURE — 71045 X-RAY EXAM CHEST 1 VIEW: CPT | Performed by: RADIOLOGY

## 2025-08-09 PROCEDURE — 80053 COMPREHEN METABOLIC PANEL: CPT

## 2025-08-09 PROCEDURE — 84484 ASSAY OF TROPONIN QUANT: CPT

## 2025-08-09 PROCEDURE — 80162 ASSAY OF DIGOXIN TOTAL: CPT

## 2025-08-09 PROCEDURE — 99285 EMERGENCY DEPT VISIT HI MDM: CPT

## 2025-08-09 PROCEDURE — 85025 COMPLETE CBC W/AUTO DIFF WBC: CPT

## 2025-08-09 PROCEDURE — 71275 CT ANGIOGRAPHY CHEST: CPT

## 2025-08-09 RX ORDER — SODIUM CHLORIDE 0.9 % (FLUSH) 0.9 %
10 SYRINGE (ML) INJECTION AS NEEDED
Status: DISCONTINUED | OUTPATIENT
Start: 2025-08-09 | End: 2025-08-09 | Stop reason: HOSPADM

## 2025-08-09 RX ORDER — IOPAMIDOL 755 MG/ML
100 INJECTION, SOLUTION INTRAVASCULAR
Status: COMPLETED | OUTPATIENT
Start: 2025-08-09 | End: 2025-08-09

## 2025-08-09 RX ORDER — ASPIRIN 81 MG/1
324 TABLET, CHEWABLE ORAL ONCE
Status: DISCONTINUED | OUTPATIENT
Start: 2025-08-09 | End: 2025-08-09

## 2025-08-09 RX ADMIN — IOPAMIDOL 70 ML: 755 INJECTION, SOLUTION INTRAVENOUS at 03:39

## 2025-08-19 ENCOUNTER — APPOINTMENT (OUTPATIENT)
Dept: GENERAL RADIOLOGY | Facility: HOSPITAL | Age: 50
End: 2025-08-19
Payer: COMMERCIAL

## 2025-08-19 ENCOUNTER — HOSPITAL ENCOUNTER (EMERGENCY)
Facility: HOSPITAL | Age: 50
Discharge: HOME | End: 2025-08-19
Attending: STUDENT IN AN ORGANIZED HEALTH CARE EDUCATION/TRAINING PROGRAM | Admitting: STUDENT IN AN ORGANIZED HEALTH CARE EDUCATION/TRAINING PROGRAM
Payer: COMMERCIAL

## 2025-08-19 VITALS
SYSTOLIC BLOOD PRESSURE: 106 MMHG | RESPIRATION RATE: 16 BRPM | TEMPERATURE: 98.5 F | WEIGHT: 240 LBS | BODY MASS INDEX: 39.99 KG/M2 | HEART RATE: 88 BPM | DIASTOLIC BLOOD PRESSURE: 64 MMHG | OXYGEN SATURATION: 99 % | HEIGHT: 65 IN

## 2025-08-19 DIAGNOSIS — R42 EPISODE OF DIZZINESS: Primary | ICD-10-CM

## 2025-08-19 LAB
ALBUMIN SERPL-MCNC: 3.7 G/DL (ref 3.5–5.2)
ALBUMIN/GLOB SERPL: 1.5 G/DL
ALP SERPL-CCNC: 95 U/L (ref 39–117)
ALT SERPL W P-5'-P-CCNC: 14 U/L (ref 1–33)
ANION GAP SERPL CALCULATED.3IONS-SCNC: 11.9 MMOL/L (ref 5–15)
AST SERPL-CCNC: 18 U/L (ref 1–32)
B PARAPERT DNA SPEC QL NAA+PROBE: NOT DETECTED
B PERT DNA SPEC QL NAA+PROBE: NOT DETECTED
BASOPHILS # BLD AUTO: 0.06 10*3/MM3 (ref 0–0.2)
BASOPHILS NFR BLD AUTO: 0.7 % (ref 0–1.5)
BILIRUB SERPL-MCNC: 0.3 MG/DL (ref 0–1.2)
BUN SERPL-MCNC: 16.8 MG/DL (ref 6–20)
BUN/CREAT SERPL: 28.5 (ref 7–25)
C PNEUM DNA NPH QL NAA+NON-PROBE: NOT DETECTED
CALCIUM SPEC-SCNC: 9.2 MG/DL (ref 8.6–10.5)
CHLORIDE SERPL-SCNC: 105 MMOL/L (ref 98–107)
CO2 SERPL-SCNC: 21.1 MMOL/L (ref 22–29)
CREAT SERPL-MCNC: 0.59 MG/DL (ref 0.57–1)
DEPRECATED RDW RBC AUTO: 48.5 FL (ref 37–54)
EGFRCR SERPLBLD CKD-EPI 2021: 110.6 ML/MIN/1.73
EOSINOPHIL # BLD AUTO: 0.42 10*3/MM3 (ref 0–0.4)
EOSINOPHIL NFR BLD AUTO: 4.7 % (ref 0.3–6.2)
ERYTHROCYTE [DISTWIDTH] IN BLOOD BY AUTOMATED COUNT: 15.2 % (ref 12.3–15.4)
FLUAV SUBTYP SPEC NAA+PROBE: NOT DETECTED
FLUBV RNA NPH QL NAA+NON-PROBE: NOT DETECTED
GLOBULIN UR ELPH-MCNC: 2.4 GM/DL
GLUCOSE SERPL-MCNC: 106 MG/DL (ref 65–99)
HADV DNA SPEC NAA+PROBE: NOT DETECTED
HCOV 229E RNA SPEC QL NAA+PROBE: NOT DETECTED
HCOV HKU1 RNA SPEC QL NAA+PROBE: NOT DETECTED
HCOV NL63 RNA SPEC QL NAA+PROBE: NOT DETECTED
HCOV OC43 RNA SPEC QL NAA+PROBE: NOT DETECTED
HCT VFR BLD AUTO: 39.3 % (ref 34–46.6)
HGB BLD-MCNC: 11.9 G/DL (ref 12–15.9)
HMPV RNA NPH QL NAA+NON-PROBE: NOT DETECTED
HPIV1 RNA ISLT QL NAA+PROBE: NOT DETECTED
HPIV2 RNA SPEC QL NAA+PROBE: NOT DETECTED
HPIV3 RNA NPH QL NAA+PROBE: NOT DETECTED
HPIV4 P GENE NPH QL NAA+PROBE: NOT DETECTED
IMM GRANULOCYTES # BLD AUTO: 0.04 10*3/MM3 (ref 0–0.05)
IMM GRANULOCYTES NFR BLD AUTO: 0.4 % (ref 0–0.5)
LYMPHOCYTES # BLD AUTO: 2.99 10*3/MM3 (ref 0.7–3.1)
LYMPHOCYTES NFR BLD AUTO: 33.6 % (ref 19.6–45.3)
M PNEUMO IGG SER IA-ACNC: NOT DETECTED
MAGNESIUM SERPL-MCNC: 2.2 MG/DL (ref 1.6–2.6)
MCH RBC QN AUTO: 26.2 PG (ref 26.6–33)
MCHC RBC AUTO-ENTMCNC: 30.3 G/DL (ref 31.5–35.7)
MCV RBC AUTO: 86.6 FL (ref 79–97)
MONOCYTES # BLD AUTO: 0.62 10*3/MM3 (ref 0.1–0.9)
MONOCYTES NFR BLD AUTO: 7 % (ref 5–12)
NEUTROPHILS NFR BLD AUTO: 4.78 10*3/MM3 (ref 1.7–7)
NEUTROPHILS NFR BLD AUTO: 53.6 % (ref 42.7–76)
NRBC BLD AUTO-RTO: 0 /100 WBC (ref 0–0.2)
NT-PROBNP SERPL-MCNC: 542 PG/ML (ref 0–450)
PLATELET # BLD AUTO: 291 10*3/MM3 (ref 140–450)
PMV BLD AUTO: 9.5 FL (ref 6–12)
POTASSIUM SERPL-SCNC: 3.7 MMOL/L (ref 3.5–5.2)
PROT SERPL-MCNC: 6.1 G/DL (ref 6–8.5)
QT INTERVAL: 474 MS
QTC INTERVAL: 529 MS
RBC # BLD AUTO: 4.54 10*6/MM3 (ref 3.77–5.28)
RHINOVIRUS RNA SPEC NAA+PROBE: NOT DETECTED
RSV RNA NPH QL NAA+NON-PROBE: NOT DETECTED
SARS-COV-2 RNA RESP QL NAA+PROBE: NOT DETECTED
SODIUM SERPL-SCNC: 138 MMOL/L (ref 136–145)
TROPONIN T SERPL HS-MCNC: 12 NG/L
WBC NRBC COR # BLD AUTO: 8.91 10*3/MM3 (ref 3.4–10.8)

## 2025-08-19 PROCEDURE — 93005 ELECTROCARDIOGRAM TRACING: CPT | Performed by: STUDENT IN AN ORGANIZED HEALTH CARE EDUCATION/TRAINING PROGRAM

## 2025-08-19 PROCEDURE — 71045 X-RAY EXAM CHEST 1 VIEW: CPT | Performed by: RADIOLOGY

## 2025-08-19 PROCEDURE — 99284 EMERGENCY DEPT VISIT MOD MDM: CPT

## 2025-08-19 PROCEDURE — 84484 ASSAY OF TROPONIN QUANT: CPT | Performed by: STUDENT IN AN ORGANIZED HEALTH CARE EDUCATION/TRAINING PROGRAM

## 2025-08-19 PROCEDURE — 85025 COMPLETE CBC W/AUTO DIFF WBC: CPT | Performed by: STUDENT IN AN ORGANIZED HEALTH CARE EDUCATION/TRAINING PROGRAM

## 2025-08-19 PROCEDURE — 0202U NFCT DS 22 TRGT SARS-COV-2: CPT | Performed by: STUDENT IN AN ORGANIZED HEALTH CARE EDUCATION/TRAINING PROGRAM

## 2025-08-19 PROCEDURE — 36415 COLL VENOUS BLD VENIPUNCTURE: CPT

## 2025-08-19 PROCEDURE — 71045 X-RAY EXAM CHEST 1 VIEW: CPT

## 2025-08-19 PROCEDURE — 83880 ASSAY OF NATRIURETIC PEPTIDE: CPT | Performed by: STUDENT IN AN ORGANIZED HEALTH CARE EDUCATION/TRAINING PROGRAM

## 2025-08-19 PROCEDURE — 83735 ASSAY OF MAGNESIUM: CPT | Performed by: STUDENT IN AN ORGANIZED HEALTH CARE EDUCATION/TRAINING PROGRAM

## 2025-08-19 PROCEDURE — 80053 COMPREHEN METABOLIC PANEL: CPT | Performed by: STUDENT IN AN ORGANIZED HEALTH CARE EDUCATION/TRAINING PROGRAM

## (undated) DEVICE — DRAPE, RADIAL, STERILE: Brand: MEDLINE

## (undated) DEVICE — LN INJ CONTRST FLXCIL HP F/M LL 1200PSI10

## (undated) DEVICE — PAD, DEFIB, ADULT, RADIOTRANS, ZOLL: Brand: MEDLINE

## (undated) DEVICE — MANIFLD NAMIC PRECEPTOR INTEGR/TRANSD RT/HND 1/PRT 500PSI

## (undated) DEVICE — CATH F5 INF 3DRC 100CM: Brand: INFINITI

## (undated) DEVICE — TR BAND RADIAL ARTERY COMPRESSION DEVICE: Brand: TR BAND

## (undated) DEVICE — GW INQW FIX/CORE PTFE J/3MM .035 260CM

## (undated) DEVICE — CATH F5 INF JL 4 100CM: Brand: INFINITI

## (undated) DEVICE — PINNACLE INTRODUCER SHEATH: Brand: PINNACLE

## (undated) DEVICE — ST EXT IV SMRTSTE 2VLV FIX M LL 6ML 41

## (undated) DEVICE — ADULT DISPOSABLE SINGLE-PATIENT USE PULSE OXIMETER SENSOR: Brand: NONIN

## (undated) DEVICE — GW J/TP MOVE/CORE 0.035 3MM/TP 145CM

## (undated) DEVICE — ST INF PRI SMRTSTE 20DRP 2VLV 24ML 117

## (undated) DEVICE — 360 - 360I 10"/10" CMSQ 8RA: Brand: MEDLINE

## (undated) DEVICE — RUNWAY RADL W/TOP PAD

## (undated) DEVICE — LN FLTR ORL/NASL MICROSTREAM NONINTUB A/LNG

## (undated) DEVICE — GLIDESHEATH SLENDER STAINLESS STEEL KIT: Brand: GLIDESHEATH SLENDER

## (undated) DEVICE — DRSNG SURESITE WNDW 4X4.5

## (undated) DEVICE — PK CATH CARD 70

## (undated) DEVICE — RADIFOCUS OPTITORQUE ANGIOGRAPHIC CATHETER: Brand: OPTITORQUE

## (undated) DEVICE — Device: Brand: MEDEX